# Patient Record
Sex: MALE | Race: WHITE | Employment: FULL TIME | URBAN - NONMETROPOLITAN AREA
[De-identification: names, ages, dates, MRNs, and addresses within clinical notes are randomized per-mention and may not be internally consistent; named-entity substitution may affect disease eponyms.]

---

## 2012-08-17 LAB
CHOLESTEROL, TOTAL: 184 MG/DL
CHOLESTEROL/HDL RATIO: 4.4
HDLC SERPL-MCNC: 42 MG/DL (ref 35–70)
LDL CHOLESTEROL CALCULATED: 119.2 MG/DL (ref 0–160)
TRIGL SERPL-MCNC: 114 MG/DL
VLDLC SERPL CALC-MCNC: 23 MG/DL

## 2017-07-24 DIAGNOSIS — I10 ESSENTIAL HYPERTENSION: Primary | ICD-10-CM

## 2017-07-25 RX ORDER — FOSINOPRIL SODIUM 40 MG/1
40 TABLET ORAL DAILY
Qty: 90 TABLET | Refills: 3 | Status: SHIPPED | OUTPATIENT
Start: 2017-07-25 | End: 2017-12-21 | Stop reason: SDUPTHER

## 2017-07-25 RX ORDER — AMLODIPINE BESYLATE 10 MG/1
10 TABLET ORAL DAILY
Qty: 90 TABLET | Refills: 3 | Status: SHIPPED | OUTPATIENT
Start: 2017-07-25 | End: 2017-12-21 | Stop reason: SDUPTHER

## 2017-07-31 ENCOUNTER — TELEPHONE (OUTPATIENT)
Dept: FAMILY MEDICINE CLINIC | Age: 47
End: 2017-07-31

## 2017-12-20 VITALS
HEIGHT: 68 IN | WEIGHT: 266 LBS | SYSTOLIC BLOOD PRESSURE: 128 MMHG | DIASTOLIC BLOOD PRESSURE: 80 MMHG | HEART RATE: 74 BPM | BODY MASS INDEX: 40.32 KG/M2

## 2017-12-20 DIAGNOSIS — J45.30 MILD PERSISTENT ASTHMA WITHOUT COMPLICATION: ICD-10-CM

## 2017-12-20 DIAGNOSIS — K21.9 GASTROESOPHAGEAL REFLUX DISEASE WITHOUT ESOPHAGITIS: ICD-10-CM

## 2017-12-20 DIAGNOSIS — G47.10 EXCESSIVE SOMNOLENCE DISORDER: ICD-10-CM

## 2017-12-20 DIAGNOSIS — Z14.8 HEMOCHROMATOSIS CARRIER: ICD-10-CM

## 2017-12-20 PROBLEM — I10 HYPERTENSION: Status: ACTIVE | Noted: 2017-12-20

## 2017-12-20 RX ORDER — IPRATROPIUM BROMIDE AND ALBUTEROL SULFATE 2.5; .5 MG/3ML; MG/3ML
1 SOLUTION RESPIRATORY (INHALATION) EVERY 6 HOURS PRN
COMMUNITY
End: 2017-12-21 | Stop reason: SDUPTHER

## 2017-12-20 RX ORDER — FLUTICASONE PROPIONATE 220 UG/1
1 AEROSOL, METERED RESPIRATORY (INHALATION) 2 TIMES DAILY
COMMUNITY
End: 2019-10-05

## 2017-12-21 ENCOUNTER — OFFICE VISIT (OUTPATIENT)
Dept: FAMILY MEDICINE CLINIC | Age: 47
End: 2017-12-21
Payer: COMMERCIAL

## 2017-12-21 VITALS
DIASTOLIC BLOOD PRESSURE: 70 MMHG | WEIGHT: 281 LBS | BODY MASS INDEX: 42.73 KG/M2 | HEART RATE: 84 BPM | SYSTOLIC BLOOD PRESSURE: 120 MMHG

## 2017-12-21 DIAGNOSIS — J45.30 MILD PERSISTENT ASTHMA WITHOUT COMPLICATION: ICD-10-CM

## 2017-12-21 DIAGNOSIS — I10 ESSENTIAL HYPERTENSION: ICD-10-CM

## 2017-12-21 DIAGNOSIS — K21.9 GASTROESOPHAGEAL REFLUX DISEASE WITHOUT ESOPHAGITIS: ICD-10-CM

## 2017-12-21 DIAGNOSIS — Z14.8 HEMOCHROMATOSIS CARRIER: ICD-10-CM

## 2017-12-21 DIAGNOSIS — Z23 NEED FOR HEPATITIS VACCINATION: ICD-10-CM

## 2017-12-21 DIAGNOSIS — F43.20 ADJUSTMENT DISORDER, UNSPECIFIED TYPE: ICD-10-CM

## 2017-12-21 DIAGNOSIS — Z00.00 EXAMINATION, MEDICAL, GENERAL: Primary | ICD-10-CM

## 2017-12-21 LAB
% SATURATION: 32 %
BASOPHILS # BLD: 0.14 THOU/MM3
DIFFERENTIAL: AUTOMATED DIFF
EOSINOPHIL # BLD: 0.29 THOU/MM3
FERRITIN: 295 NG/ML
HCT VFR BLD CALC: 43.5 %
HEMOGLOBIN: 13.9 G/DL
IRON: 100 MG/DL
LYMPHOCYTES # BLD: 2.36 THOU/MM3
MCH RBC QN AUTO: 27.2 PG
MCHC RBC AUTO-ENTMCNC: 31.9 G/DL
MCV RBC AUTO: 85.5 FL
MONOCYTES # BLD: 0.83 THOU/MM3
NEUTROPHILS: 3.85 THOU/MM3
PDW BLD-RTO: 12.6 %
PLATELET # BLD: 252 THOU/MM3
PMV BLD AUTO: 8.9 FL
RBC # BLD: 5.09 M/UL
TOTAL IRON BINDING CAPACITY: 307 MG/DL
WBC # BLD: 7.47 THOU/ML3

## 2017-12-21 PROCEDURE — 99396 PREV VISIT EST AGE 40-64: CPT | Performed by: FAMILY MEDICINE

## 2017-12-21 PROCEDURE — 90471 IMMUNIZATION ADMIN: CPT | Performed by: FAMILY MEDICINE

## 2017-12-21 PROCEDURE — 90688 IIV4 VACCINE SPLT 0.5 ML IM: CPT | Performed by: FAMILY MEDICINE

## 2017-12-21 RX ORDER — FOSINOPRIL SODIUM 40 MG/1
40 TABLET ORAL DAILY
Qty: 90 TABLET | Refills: 3 | Status: SHIPPED | OUTPATIENT
Start: 2017-12-21 | End: 2018-12-20 | Stop reason: SDUPTHER

## 2017-12-21 RX ORDER — IPRATROPIUM BROMIDE AND ALBUTEROL SULFATE 2.5; .5 MG/3ML; MG/3ML
1 SOLUTION RESPIRATORY (INHALATION) EVERY 6 HOURS PRN
Qty: 360 ML | Refills: 1 | Status: SHIPPED | OUTPATIENT
Start: 2017-12-21 | End: 2020-03-10 | Stop reason: SDUPTHER

## 2017-12-21 RX ORDER — AMLODIPINE BESYLATE 10 MG/1
10 TABLET ORAL DAILY
Qty: 90 TABLET | Refills: 3 | Status: SHIPPED | OUTPATIENT
Start: 2017-12-21 | End: 2018-12-20 | Stop reason: SDUPTHER

## 2017-12-25 ASSESSMENT — ENCOUNTER SYMPTOMS
SORE THROAT: 0
CHEST TIGHTNESS: 0
ABDOMINAL PAIN: 0
SHORTNESS OF BREATH: 0
WHEEZING: 0
ABDOMINAL DISTENTION: 0

## 2017-12-25 NOTE — PROGRESS NOTES
[address] [address] [address]  Dept: [phone]  Dept Fax: [phone]    Sim Boyle is a 50 y.o. male who presents today for his medical conditions/complaints as noted below. Sim Boyle is c/o of Hypertension (not checking BP much at all, when does check its ok, denies chest pain, s.o.b, palpatations) and Annual Exam      HPI:     HPI   Patient is here for a regular checkup ; only comes in once a year   Hypertension  Checks his BP rarely  well controlled; BP: 120/70   No problems with medication side effects; tolerating well  No chest pain  No edema   . Hemochromatosis  Carrier of gene; compound heterozygote for C282 and H63D mutations    He donates blood every so often to keep his Hb down  No longer goes to see hematology last notes from Dr Gen Mccracken in 2015    AZEB  Recently diagnosed with Dr Kate Aceves ; says he doesn't know how he did without it   Has CPAP, compliant with: wears faithfully  Feels rested upon awakening  Denies excessive somnolence  Last evaluated  Pulmonologist: Dr Kate Aceves  Pulmonary function tests: 5/2017    Stress  Says he feels like he can't relax  Things at work get on his nerves  Wife says he is irritable  \"doesn't want to start a pill or anything\"      GERD  Patient is doing well;    Not taking any medication  Denies heartburn  Denies dysphagia  Denies melena or hematochezia   Last EGD: none had upper GI series in 2013 ; normal   Gastroenterologist : none though he saw Dr Ruth Kaufman in the past for hemochromatosis         BP Readings from Last 3 Encounters:   12/21/17 120/70   12/13/16 128/80            (goal 120/80)    Past Medical History:   Diagnosis Date    Asthma     GERD (gastroesophageal reflux disease)     Hemochromatosis carrier     Hypertension       Past Surgical History:   Procedure Laterality Date    VASECTOMY       Family History   Problem Relation Age of Onset    Coronary Art Dis Mother    [facility] Other Mother aortic aneurysm     Social History   Substance Use Topics    Smoking status: Former Smoker    Smokeless tobacco: Former User     Types: Chew    Alcohol use No        Current Outpatient Prescriptions   Medication Sig Dispense Refill    amLODIPine (NORVASC) 10 MG tablet Take 1 tablet by mouth daily 90 tablet 3    fosinopril (MONOPRIL) 40 MG tablet Take 1 tablet by mouth daily 90 tablet 3    ipratropium-albuterol (DUONEB) 0.5-2.5 (3) MG/3ML SOLN nebulizer solution Inhale 3 mLs into the lungs every 6 hours as needed for Shortness of Breath 360 mL 1    fluticasone (FLOVENT HFA) 220 MCG/ACT inhaler Inhale 1 puff into the lungs 2 times daily       No current facility-administered medications for this visit. No Known Allergies    Health Maintenance   Topic Date Due    HIV screen  02/19/1983    DTaP/Tdap/Td vaccine (1 - Tdap) 10/05/2009    Lipid screen  08/17/2017    Flu vaccine  Completed    Pneumococcal med risk  Completed       Subjective:      Review of Systems   Constitutional: Negative for chills, diaphoresis and fever. HENT: Negative for sore throat. Respiratory: Negative for chest tightness, shortness of breath and wheezing. Cardiovascular: Negative for chest pain, palpitations and leg swelling. Gastrointestinal: Negative for abdominal distention and abdominal pain. Genitourinary: Negative for difficulty urinating, dysuria and hematuria. Hematological: Negative for adenopathy. Objective:     /70   Pulse 84   Wt 281 lb (127.5 kg)   BMI 42.73 kg/m²     Physical Exam   Constitutional: He appears well-developed and well-nourished. No distress. HENT:   Head: Normocephalic and atraumatic. Right Ear: Tympanic membrane normal.   Left Ear: Tympanic membrane normal.   Nose: Nose normal.   Mouth/Throat: No posterior oropharyngeal edema or posterior oropharyngeal erythema. Constricted oropharynx   Neck: Neck supple. Carotid bruit is not present. No thyromegaly present. Cardiovascular: Regular rhythm, S1 normal and S2 normal.    No murmur heard. Pulmonary/Chest: Breath sounds normal. He has no wheezes. He has no rhonchi. He has no rales. Abdominal: Soft. Bowel sounds are normal. There is no hepatosplenomegaly. There is no tenderness. Musculoskeletal: Normal range of motion. He exhibits no edema. Lymphadenopathy:     He has no cervical adenopathy. He has no axillary adenopathy. Skin: No rash noted. Psychiatric: He has a normal mood and affect. His speech is normal and behavior is normal.   Vitals reviewed. Assessment:     1. Examination, medical, general     2. Need for hepatitis vaccination  INFLUENZA, QUADV, 3 YRS AND OLDER, IM, MDV, 0.5ML (FLUZONE QUADV)   3. Gastroesophageal reflux disease without esophagitis     4. Hemochromatosis carrier  CBC With Auto Differential    Iron And TIBC    Ferritin   5. Essential hypertension  amLODIPine (NORVASC) 10 MG tablet    fosinopril (MONOPRIL) 40 MG tablet   6. Mild persistent asthma without complication  ipratropium-albuterol (DUONEB) 0.5-2.5 (3) MG/3ML SOLN nebulizer solution   7. Adjustment disorder, unspecified type              POC Testing Results (If Applicable):  No results found for this visit on 12/21/17. Plan:   Continue current meds  Continue CPAP and follow up with Dr Benjy Fraga  Suggestions given for dealing with stress, etc  Patient just wants to follow up once a year though he probably should follow up every 6 months minimum      Orders Given:  Orders Placed This Encounter   Procedures    INFLUENZA, QUADV, 3 YRS AND OLDER, IM, MDV, 0.5ML (FLUZONE QUADV)    CBC With Auto Differential     Standing Status:   Future     Standing Expiration Date:   12/21/2018    Iron And TIBC     Standing Status:   Future     Standing Expiration Date:   12/21/2018     Order Specific Question:   Is Patient Fasting? Answer:   no     Order Specific Question:   No of Hours?      Answer:   4    Ferritin     Standing Status: Future     Standing Expiration Date:   12/21/2018    CBC Auto Differential    Iron and TIBC    Ferritin     Prescriptions:    Orders Placed This Encounter   Medications    amLODIPine (NORVASC) 10 MG tablet     Sig: Take 1 tablet by mouth daily     Dispense:  90 tablet     Refill:  3    fosinopril (MONOPRIL) 40 MG tablet     Sig: Take 1 tablet by mouth daily     Dispense:  90 tablet     Refill:  3    ipratropium-albuterol (DUONEB) 0.5-2.5 (3) MG/3ML SOLN nebulizer solution     Sig: Inhale 3 mLs into the lungs every 6 hours as needed for Shortness of Breath     Dispense:  360 mL     Refill:  1           Electronically signed by Jj Head MD on 12/25/2017.

## 2018-12-20 ENCOUNTER — OFFICE VISIT (OUTPATIENT)
Dept: FAMILY MEDICINE CLINIC | Age: 48
End: 2018-12-20
Payer: COMMERCIAL

## 2018-12-20 VITALS
DIASTOLIC BLOOD PRESSURE: 62 MMHG | OXYGEN SATURATION: 99 % | HEART RATE: 98 BPM | WEIGHT: 282.2 LBS | SYSTOLIC BLOOD PRESSURE: 118 MMHG | HEIGHT: 68 IN | TEMPERATURE: 98.7 F | BODY MASS INDEX: 42.77 KG/M2

## 2018-12-20 DIAGNOSIS — F43.20 ADJUSTMENT DISORDER, UNSPECIFIED TYPE: ICD-10-CM

## 2018-12-20 DIAGNOSIS — I10 ESSENTIAL HYPERTENSION: ICD-10-CM

## 2018-12-20 DIAGNOSIS — G47.33 OSA (OBSTRUCTIVE SLEEP APNEA): ICD-10-CM

## 2018-12-20 DIAGNOSIS — Z13.220 SCREENING, LIPID: ICD-10-CM

## 2018-12-20 DIAGNOSIS — M25.512 CHRONIC LEFT SHOULDER PAIN: ICD-10-CM

## 2018-12-20 DIAGNOSIS — Z12.11 ENCOUNTER FOR SCREENING COLONOSCOPY: ICD-10-CM

## 2018-12-20 DIAGNOSIS — Z14.8 HEMOCHROMATOSIS CARRIER: ICD-10-CM

## 2018-12-20 DIAGNOSIS — Z23 NEED FOR PROPHYLACTIC VACCINATION AND INOCULATION AGAINST INFLUENZA: Primary | ICD-10-CM

## 2018-12-20 DIAGNOSIS — K21.9 GASTROESOPHAGEAL REFLUX DISEASE WITHOUT ESOPHAGITIS: ICD-10-CM

## 2018-12-20 DIAGNOSIS — G89.29 CHRONIC LEFT SHOULDER PAIN: ICD-10-CM

## 2018-12-20 PROCEDURE — 99396 PREV VISIT EST AGE 40-64: CPT | Performed by: FAMILY MEDICINE

## 2018-12-20 PROCEDURE — 99213 OFFICE O/P EST LOW 20 MIN: CPT | Performed by: FAMILY MEDICINE

## 2018-12-20 PROCEDURE — 90471 IMMUNIZATION ADMIN: CPT | Performed by: FAMILY MEDICINE

## 2018-12-20 PROCEDURE — 90686 IIV4 VACC NO PRSV 0.5 ML IM: CPT | Performed by: FAMILY MEDICINE

## 2018-12-20 RX ORDER — FOSINOPRIL SODIUM 40 MG/1
40 TABLET ORAL DAILY
Qty: 90 TABLET | Refills: 3 | Status: SHIPPED | OUTPATIENT
Start: 2018-12-20 | End: 2020-01-20

## 2018-12-20 RX ORDER — MELOXICAM 15 MG/1
15 TABLET ORAL DAILY
Qty: 30 TABLET | Refills: 3 | Status: SHIPPED | OUTPATIENT
Start: 2018-12-20 | End: 2019-10-05 | Stop reason: ALTCHOICE

## 2018-12-20 RX ORDER — AMLODIPINE BESYLATE 10 MG/1
10 TABLET ORAL DAILY
Qty: 90 TABLET | Refills: 3 | Status: SHIPPED | OUTPATIENT
Start: 2018-12-20 | End: 2020-01-20

## 2018-12-20 ASSESSMENT — PATIENT HEALTH QUESTIONNAIRE - PHQ9
SUM OF ALL RESPONSES TO PHQ QUESTIONS 1-9: 0
SUM OF ALL RESPONSES TO PHQ QUESTIONS 1-9: 0
1. LITTLE INTEREST OR PLEASURE IN DOING THINGS: 0
2. FEELING DOWN, DEPRESSED OR HOPELESS: 0
SUM OF ALL RESPONSES TO PHQ9 QUESTIONS 1 & 2: 0

## 2018-12-22 LAB
% SATURATION: 56 %
A/G RATIO: 1.3 RATIO
AGE FOR GFR: 50
ALBUMIN: 4 G/DL
ALK PHOSPHATASE: 106 UNITS/L
ALT SERPL-CCNC: 33 UNITS/L
ANION GAP SERPL CALCULATED.3IONS-SCNC: 12 MMOL/L
AST SERPL-CCNC: 30 UNITS/L
BASOPHILS # BLD: 0.11 THOU/MM3
BILIRUB SERPL-MCNC: 0.8 MG/DL
BILIRUBIN DIRECT: 0 MG/DL
BUN BLDV-MCNC: 15 MG/DL
CALCIUM SERPL-MCNC: 9.2 MG/DL
CHLORIDE BLD-SCNC: 107 MMOL/L
CHOLESTEROL/HDL RATIO: 4.6 RATIO
CHOLESTEROL: 180 MG/DL
CO2: 25 MMOL/L
CREAT SERPL-MCNC: 0.7 MG/DL
DIFFERENTIAL: AUTOMATED DIFF
EGFR BF: 107 ML/MIN/1.73 M2
EGFR BM: 145 ML/MIN/1.73 M2
EGFR WF: 89 ML/MIN/1.73 M2
EGFR WM: 119 ML/MIN/1.73 M2
EOSINOPHIL # BLD: 0.23 THOU/MM3
FERRITIN: 220 NG/ML
GLOBULIN: 3.1 G/DL
GLUCOSE: 94 MG/DL
HCT VFR BLD CALC: 42.5 %
HDL, DIRECT: 39 MG/DL
HEMOGLOBIN: 13.5 G/DL
IRON: 165 MG/DL
LDL CHOLESTEROL CALCULATED: 116.4 MG/DL
LYMPHOCYTES # BLD: 1.73 THOU/MM3
MCH RBC QN AUTO: 27 PG
MCHC RBC AUTO-ENTMCNC: 31.6 G/DL
MCV RBC AUTO: 85.2 FL
MONOCYTES # BLD: 0.57 THOU/MM3
NEUTROPHILS: 2.82 THOU/MM3
PDW BLD-RTO: 12.4 %
PLATELET # BLD: 265 THOU/MM3
PMV BLD AUTO: 8 FL
POTASSIUM SERPL-SCNC: 4 MMOL/L
RBC # BLD: 4.99 M/UL
SODIUM BLD-SCNC: 140 MMOL/L
TOTAL IRON BINDING CAPACITY: 297 MG/DL
TOTAL PROTEIN: 7.1 G/DL
TRIGL SERPL-MCNC: 123 MG/DL
VLDLC SERPL CALC-MCNC: 25 MG/DL
WBC # BLD: 5.46 THOU/ML3

## 2018-12-27 ASSESSMENT — ENCOUNTER SYMPTOMS
BACK PAIN: 1
ABDOMINAL DISTENTION: 0
WHEEZING: 0
BLOOD IN STOOL: 0
CHEST TIGHTNESS: 0
SHORTNESS OF BREATH: 0
ABDOMINAL PAIN: 0

## 2019-01-07 ENCOUNTER — TELEPHONE (OUTPATIENT)
Dept: FAMILY MEDICINE CLINIC | Age: 49
End: 2019-01-07

## 2019-01-10 ENCOUNTER — OFFICE VISIT (OUTPATIENT)
Dept: SURGERY | Age: 49
End: 2019-01-10

## 2019-01-10 DIAGNOSIS — Z12.11 ENCOUNTER FOR SCREENING COLONOSCOPY: Primary | ICD-10-CM

## 2019-01-10 RX ORDER — BISACODYL 5 MG
5 TABLET, DELAYED RELEASE (ENTERIC COATED) ORAL ONCE
Qty: 2 TABLET | Refills: 0 | Status: CANCELLED | OUTPATIENT
Start: 2019-01-10 | End: 2019-01-10

## 2019-10-05 ENCOUNTER — OFFICE VISIT (OUTPATIENT)
Dept: FAMILY MEDICINE CLINIC | Age: 49
End: 2019-10-05
Payer: COMMERCIAL

## 2019-10-05 VITALS
DIASTOLIC BLOOD PRESSURE: 72 MMHG | BODY MASS INDEX: 42.88 KG/M2 | HEART RATE: 79 BPM | WEIGHT: 282 LBS | OXYGEN SATURATION: 99 % | SYSTOLIC BLOOD PRESSURE: 134 MMHG

## 2019-10-05 DIAGNOSIS — M72.2 PLANTAR FASCIITIS, RIGHT: Primary | ICD-10-CM

## 2019-10-05 PROCEDURE — 99213 OFFICE O/P EST LOW 20 MIN: CPT | Performed by: NURSE PRACTITIONER

## 2019-10-05 RX ORDER — PREDNISONE 20 MG/1
20 TABLET ORAL 2 TIMES DAILY
Qty: 10 TABLET | Refills: 0 | Status: SHIPPED | OUTPATIENT
Start: 2019-10-05 | End: 2019-10-10

## 2019-10-05 ASSESSMENT — PATIENT HEALTH QUESTIONNAIRE - PHQ9
SUM OF ALL RESPONSES TO PHQ QUESTIONS 1-9: 0
SUM OF ALL RESPONSES TO PHQ QUESTIONS 1-9: 0
1. LITTLE INTEREST OR PLEASURE IN DOING THINGS: 0
SUM OF ALL RESPONSES TO PHQ9 QUESTIONS 1 & 2: 0
2. FEELING DOWN, DEPRESSED OR HOPELESS: 0

## 2020-01-28 RX ORDER — AMLODIPINE BESYLATE 10 MG/1
10 TABLET ORAL DAILY
Qty: 90 TABLET | Refills: 3 | Status: SHIPPED | OUTPATIENT
Start: 2020-01-28 | End: 2020-03-10 | Stop reason: CLARIF

## 2020-01-28 RX ORDER — FOSINOPRIL SODIUM 40 MG/1
40 TABLET ORAL DAILY
Qty: 90 TABLET | Refills: 3 | Status: SHIPPED | OUTPATIENT
Start: 2020-01-28 | End: 2020-03-10 | Stop reason: CLARIF

## 2020-01-28 NOTE — TELEPHONE ENCOUNTER
Joni Carmona is requesting a refill on the following medication(s):  Requested Prescriptions     Pending Prescriptions Disp Refills    amLODIPine (NORVASC) 10 MG tablet 90 tablet 3     Sig: Take 1 tablet by mouth daily    fosinopril (MONOPRIL) 40 MG tablet 90 tablet 3     Sig: Take 1 tablet by mouth daily       Last Visit Date (If Applicable):  37/78/6907    Next Visit Date:    3/10/2020

## 2020-03-10 ENCOUNTER — HOSPITAL ENCOUNTER (OUTPATIENT)
Age: 50
Setting detail: SPECIMEN
Discharge: HOME | End: 2020-03-10
Payer: COMMERCIAL

## 2020-03-10 ENCOUNTER — OFFICE VISIT (OUTPATIENT)
Dept: FAMILY MEDICINE CLINIC | Age: 50
End: 2020-03-10
Payer: COMMERCIAL

## 2020-03-10 VITALS
BODY MASS INDEX: 41.66 KG/M2 | WEIGHT: 274 LBS | HEART RATE: 70 BPM | SYSTOLIC BLOOD PRESSURE: 132 MMHG | OXYGEN SATURATION: 98 % | DIASTOLIC BLOOD PRESSURE: 86 MMHG

## 2020-03-10 PROBLEM — E83.110 HEREDITARY HEMOCHROMATOSIS (HCC): Status: ACTIVE | Noted: 2020-03-10

## 2020-03-10 LAB
ABSOLUTE EOS #: 0.17 K/UL (ref 0–0.44)
ABSOLUTE IMMATURE GRANULOCYTE: <0.03 K/UL (ref 0–0.3)
ABSOLUTE LYMPH #: 1.87 K/UL (ref 1.1–3.7)
ABSOLUTE MONO #: 0.74 K/UL (ref 0.1–1.2)
ALBUMIN SERPL-MCNC: 4.3 G/DL (ref 3.5–5.2)
ALBUMIN/GLOBULIN RATIO: 1.7 (ref 1–2.5)
ALP BLD-CCNC: 88 U/L (ref 40–129)
ALT SERPL-CCNC: 24 U/L (ref 5–41)
ANION GAP SERPL CALCULATED.3IONS-SCNC: 14 MMOL/L (ref 9–17)
AST SERPL-CCNC: 21 U/L
BASOPHILS # BLD: 1 % (ref 0–2)
BASOPHILS ABSOLUTE: 0.07 K/UL (ref 0–0.2)
BILIRUB SERPL-MCNC: 0.41 MG/DL (ref 0.3–1.2)
BUN BLDV-MCNC: 15 MG/DL (ref 6–20)
BUN/CREAT BLD: 14 (ref 9–20)
CALCIUM SERPL-MCNC: 9.3 MG/DL (ref 8.6–10.4)
CHLORIDE BLD-SCNC: 101 MMOL/L (ref 98–107)
CHOLESTEROL/HDL RATIO: 4.4
CHOLESTEROL: 195 MG/DL
CO2: 26 MMOL/L (ref 20–31)
CREAT SERPL-MCNC: 1.08 MG/DL (ref 0.7–1.2)
DIFFERENTIAL TYPE: ABNORMAL
EOSINOPHILS RELATIVE PERCENT: 2 % (ref 1–4)
GFR AFRICAN AMERICAN: >60 ML/MIN
GFR NON-AFRICAN AMERICAN: >60 ML/MIN
GFR SERPL CREATININE-BSD FRML MDRD: NORMAL ML/MIN/{1.73_M2}
GFR SERPL CREATININE-BSD FRML MDRD: NORMAL ML/MIN/{1.73_M2}
GLUCOSE FASTING: 83 MG/DL (ref 70–99)
HCT VFR BLD CALC: 40.1 % (ref 40.7–50.3)
HDLC SERPL-MCNC: 44 MG/DL
HEMOGLOBIN: 13 G/DL (ref 13–17)
IMMATURE GRANULOCYTES: 0 %
LDL CHOLESTEROL: 121 MG/DL (ref 0–130)
LYMPHOCYTES # BLD: 24 % (ref 24–43)
MCH RBC QN AUTO: 27.7 PG (ref 25.2–33.5)
MCHC RBC AUTO-ENTMCNC: 32.4 G/DL (ref 25.2–33.5)
MCV RBC AUTO: 85.3 FL (ref 82.6–102.9)
MONOCYTES # BLD: 9 % (ref 3–12)
NRBC AUTOMATED: 0 PER 100 WBC
PDW BLD-RTO: 14 % (ref 11.8–14.4)
PLATELET # BLD: 274 K/UL (ref 138–453)
PLATELET ESTIMATE: ABNORMAL
PMV BLD AUTO: 11.2 FL (ref 8.1–13.5)
POTASSIUM SERPL-SCNC: 3.9 MMOL/L (ref 3.7–5.3)
RBC # BLD: 4.7 M/UL (ref 4.21–5.77)
RBC # BLD: ABNORMAL 10*6/UL
SEG NEUTROPHILS: 64 % (ref 36–65)
SEGMENTED NEUTROPHILS ABSOLUTE COUNT: 5.03 K/UL (ref 1.5–8.1)
SODIUM BLD-SCNC: 141 MMOL/L (ref 135–144)
TOTAL PROTEIN: 6.8 G/DL (ref 6.4–8.3)
TRIGL SERPL-MCNC: 152 MG/DL
VLDLC SERPL CALC-MCNC: ABNORMAL MG/DL (ref 1–30)
WBC # BLD: 7.9 K/UL (ref 3.5–11.3)
WBC # BLD: ABNORMAL 10*3/UL

## 2020-03-10 PROCEDURE — 85025 COMPLETE CBC W/AUTO DIFF WBC: CPT

## 2020-03-10 PROCEDURE — 83550 IRON BINDING TEST: CPT

## 2020-03-10 PROCEDURE — 80061 LIPID PANEL: CPT

## 2020-03-10 PROCEDURE — 99214 OFFICE O/P EST MOD 30 MIN: CPT | Performed by: FAMILY MEDICINE

## 2020-03-10 PROCEDURE — 36415 COLL VENOUS BLD VENIPUNCTURE: CPT

## 2020-03-10 PROCEDURE — 80053 COMPREHEN METABOLIC PANEL: CPT

## 2020-03-10 PROCEDURE — 82728 ASSAY OF FERRITIN: CPT

## 2020-03-10 PROCEDURE — 83540 ASSAY OF IRON: CPT

## 2020-03-10 RX ORDER — IPRATROPIUM BROMIDE AND ALBUTEROL SULFATE 2.5; .5 MG/3ML; MG/3ML
1 SOLUTION RESPIRATORY (INHALATION) EVERY 6 HOURS PRN
Qty: 360 ML | Refills: 3 | Status: SHIPPED | OUTPATIENT
Start: 2020-03-10 | End: 2022-03-07

## 2020-03-10 RX ORDER — AMLODIPINE BESYLATE 10 MG/1
TABLET ORAL
Qty: 90 TABLET | Refills: 3 | Status: SHIPPED | OUTPATIENT
Start: 2020-03-10 | End: 2021-01-27 | Stop reason: SDUPTHER

## 2020-03-10 RX ORDER — FOSINOPRIL SODIUM 40 MG/1
TABLET ORAL
Qty: 90 TABLET | Refills: 3 | Status: SHIPPED | OUTPATIENT
Start: 2020-03-10 | End: 2021-01-27 | Stop reason: SDUPTHER

## 2020-03-10 ASSESSMENT — ENCOUNTER SYMPTOMS
BACK PAIN: 0
WHEEZING: 0
ABDOMINAL DISTENTION: 0
SHORTNESS OF BREATH: 0
COUGH: 0
BLOOD IN STOOL: 0
ABDOMINAL PAIN: 0

## 2020-03-10 ASSESSMENT — PATIENT HEALTH QUESTIONNAIRE - PHQ9
SUM OF ALL RESPONSES TO PHQ9 QUESTIONS 1 & 2: 0
SUM OF ALL RESPONSES TO PHQ QUESTIONS 1-9: 0
SUM OF ALL RESPONSES TO PHQ QUESTIONS 1-9: 0
1. LITTLE INTEREST OR PLEASURE IN DOING THINGS: 0
2. FEELING DOWN, DEPRESSED OR HOPELESS: 0

## 2020-03-10 NOTE — PROGRESS NOTES
[address] [address] [address]  Dept: [phone]  DeptFax: [phone]    Rukhsana Santos is a53 y.o. male who presents today for his medical conditions/complaints as noted below. Rukhsana Santos is c/o of Gastroesophageal Reflux (no issues or complaints); Hypertension (no issues); and Medication Refill      HPI:     HPI     Patient returns to my request 1 year follow-up. His problems include    Hypertension  The patient checks his blood pressures rarely. His blood pressure for us is 132/86. Harish Burris He remains on Monopril 40 mg and Norvasc 10 mg. He has no chest pain or chest pressure. No cough. No edema. Results for Rossi Ee (MRN [id number]) as of 3/10/2020 07:06   Ref. Range 12/22/2018 08:10   Chol/HDL Ratio Latest Ref Range: 0.0 - 4.5 ratio 4.6 (H)   Cholesterol Latest Ref Range: 50 - 200 mg/dL 180   LDL Calculated Latest Ref Range: 0.0 - 160 mg/dL 116.4   Triglycerides Latest Ref Range: 10 - 250 mg/dL 123   VLDL Latest Ref Range: 0 - 40 mg/dL 25          Hemochromatosis  The patient is a compound heterozygote for hereditary hemochromatosis carrying a copy of both the C282Y gene and the H63D gene. He no longer sees Dr. Hazel Briceño. He donates blood about every 3 months. Or as often as he can depending on his work schedule. In the past he has had a MRI of his liver suggesting iron deposition and was referred to Gastroenterology, Dr. Reed Wills. I am unable to locate those records. However, he states that Gastroenterology did not want to do a liver biopsy. Harish Burris He has no itching. He has no headache. He has no flushing. Results for Rossi Ee (MRN [id number]) as of 3/10/2020 07:06   Ref.  Range 12/22/2018 08:10   WBC Latest Ref Range: 4.80 - 10 thou/ml3 5.46   RBC Latest Ref Range: 4.70 - 6.1 M/uL 4.99   Hemoglobin Quant Latest Ref Range: 13.8 - 17 g/dL 13.5 (L)   Hematocrit Latest Ref Range: 42.0 - 52 % 42.5   MCV Latest Ref Range: 80.0 - 94 fl 85.2   MCH Latest Ref Range: 28.5 - 32 pg 27.0 (L)   MCHC Latest Ref Range: 32.0 - 37 g/dL 31.6 (L)   MPV Latest Ref Range: 7.4 - 11.0 fl 8.0   RDW Latest Ref Range: 10.0 - 15 % 12.4   Platelet Count Latest Ref Range: 130 - 400 thou/mm3 265     Results for Christopher Allen (MRN [id number]) as of 3/10/2020 07:06   Ref. Range 12/22/2018 08:10   Ferritin Latest Ref Range: 16.00 - 32 ng/mL 220   Iron Latest Ref Range: 49 - 181 mg/dL 165   TIBC Latest Ref Range: 261 - 497 mg/dL 297        Obstructive sleep apnea  He wears his CPAP faithfully. He no longer follows with Pulmonology. He used to see Dr. Ernesto Marks. He has no excessive somnolence or fatigue. He had pulmonary function tests in 2017.      GERD  The patient is asymptomatic. No heartburn. No dysphagia. No melena or hematochezia. An upper GI series in 2013 was normal.     Adjustment disorder  In the past he has had some troubles with struggles with irritability, being unable to relax, stressed out at work. 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 Present time he deals with the situation. Did not see me quite as bad as it was. Probably stress related    Patient also has problems with his left ankle. He twisted his left ankle couple times. He has been wearing a brace. But the left ankle feels unstable. Feels like less he is careful he will suffer from eversion sprain again. Does not hurt to bear weight. Not swollen.       BP Readings from Last 3 Encounters:   03/10/20 132/86   10/05/19 134/72   12/20/18 118/62            (goal 120/80)    Past Medical History:   Diagnosis Date    Asthma     GERD (gastroesophageal reflux disease)     Hemochromatosis carrier     Hypertension       Past Surgical History:   Procedure Laterality Date    COLONOSCOPY  01/25/2019    Dr. Kenton Britton at [address] [place]       Family History   Problem Relation Age of Onset    Coronary Art Dis Mother     Other Mother         aortic aneurysm    Breast Cancer Mother     Cancer Father      Social History     Tobacco Use    Smoking status: Former Smoker    Smokeless tobacco: Former User     Types: Chew   Substance Use Topics    Alcohol use: No        Current Outpatient Medications   Medication Sig Dispense Refill    ipratropium-albuterol (DUONEB) 0.5-2.5 (3) MG/3ML SOLN nebulizer solution Inhale 3 mLs into the lungs every 6 hours as needed for Shortness of Breath 360 mL 3    fosinopril (MONOPRIL) 40 MG tablet TAKE 1 TABLET DAILY 90 tablet 3    amLODIPine (NORVASC) 10 MG tablet TAKE 1 TABLET DAILY 90 tablet 3     No current facility-administered medications for this visit. No Known Allergies    Health Maintenance   Topic Date Due    HIV screen  02/19/1983    DTaP/Tdap/Td vaccine (1 - Tdap) 02/19/1987    Shingles Vaccine (1 of 2) 02/19/2018    Potassium monitoring  12/22/2019    Creatinine monitoring  12/22/2019    Lipid screen  12/22/2023    Colon cancer screen colonoscopy  01/25/2029    Flu vaccine  Completed    Pneumococcal 0-64 years Vaccine  Completed    Hepatitis A vaccine  Aged Out    Hepatitis B vaccine  Aged Out    Hib vaccine  Aged Out    Meningococcal (ACWY) vaccine  Aged Out       Lab Results   Component Value Date    K 4.0 12/22/2018    CREATININE 0.7 12/22/2018    AST 30 12/22/2018    ALT 33 12/22/2018    HCT 42.5 12/22/2018    GLUCOSE 94 12/22/2018    CALCIUM 9.2 12/22/2018    FERRITIN 220 12/22/2018    TIBC 297 12/22/2018    IRON 165 12/22/2018      Lab Results   Component Value Date    CHOL 180 12/22/2018    CHOL 184 08/17/2012    TRIG 123 12/22/2018    HDL 42 08/17/2012       Subjective:      Review of Systems   Constitutional: Negative for chills, diaphoresis, fatigue, fever and unexpected weight change. HENT: Negative. Respiratory: Negative for cough, shortness of breath and wheezing. Cardiovascular: Negative for chest pain, palpitations and leg swelling.    Gastrointestinal: Negative for abdominal distention, abdominal pain and blood in stool. Endocrine: Negative. Genitourinary: Negative for difficulty urinating, dysuria and hematuria. Musculoskeletal: Negative for arthralgias and back pain. Hematological: Negative for adenopathy. Psychiatric/Behavioral: The patient is not nervous/anxious. Objective:     /86   Pulse 70   Wt 274 lb (124.3 kg)   SpO2 98%   BMI 41.66 kg/m²     Physical Exam  Vitals signs reviewed. Constitutional:       General: He is not in acute distress. Appearance: He is well-developed. HENT:      Head: Normocephalic and atraumatic. Right Ear: Tympanic membrane normal.      Left Ear: Tympanic membrane normal.      Nose: Nose normal.      Mouth/Throat:      Pharynx: No posterior oropharyngeal erythema. Neck:      Musculoskeletal: Neck supple. Thyroid: No thyromegaly. Vascular: No carotid bruit. Cardiovascular:      Rate and Rhythm: Regular rhythm. Heart sounds: S1 normal and S2 normal. No murmur. Pulmonary:      Breath sounds: Normal breath sounds. No wheezing, rhonchi or rales. Abdominal:      General: Bowel sounds are normal.      Palpations: Abdomen is soft. Tenderness: There is no abdominal tenderness. Musculoskeletal:      Left ankle: He exhibits normal range of motion and no swelling. No lateral malleolus, no AITFL, no CF ligament and no posterior TFL tenderness found. Comments: Left ankle is not unstable to resisted stress. Cannot really palpate any tendon defect. Lymphadenopathy:      Cervical: No cervical adenopathy. Skin:     Findings: No rash. Psychiatric:         Speech: Speech normal.         Behavior: Behavior normal.         Assessment:      Diagnosis Orders   1. Hereditary hemochromatosis (HCC)  Iron And TIBC    Ferritin   2. Mild persistent asthma without complication  ipratropium-albuterol (DUONEB) 0.5-2.5 (3) MG/3ML SOLN nebulizer solution   3.  Essential hypertension  CBC Auto Ki Sanchez MD on3/10/2020. **This report has been created using voice recognition software. It may contain minor errors which are inherent in voice recognition technology. **

## 2020-03-10 NOTE — PATIENT INSTRUCTIONS
Patient Education        Chronic Ankle Laxity: Exercises  Introduction  Here are some examples of exercises for you to try. The exercises may be suggested for a condition or for rehabilitation. Start each exercise slowly. Ease off the exercises if you start to have pain. You will be told when to start these exercises and which ones will work best for you. How to do the exercises  Resisted ankle inversion   1. Sit on the floor with your good leg crossed over your other leg. 2. Hold both ends of an exercise band and loop the band around the inside of your affected foot. Then press your other foot against the band. 3. Keeping your legs crossed, slowly push your affected foot against the band so that foot moves away from your other foot. Then slowly relax. 4. Repeat 8 to 12 times. Resisted ankle eversion   1. Sit on the floor with your legs straight. 2. Hold both ends of an exercise band and loop the band around the outside of your affected foot. Then press your other foot against the band. 3. Keeping your leg straight, slowly push your affected foot outward against the band and away from your other foot without letting your leg rotate. Then slowly relax. 4. Repeat 8 to 12 times. Resisted ankle dorsiflexion   1. Tie the ends of an exercise band together to form a loop. Attach one end of the loop to a secure object or shut a door on it to hold it in place. (Or you can have someone hold one end of the loop to provide resistance.)  2. While sitting on the floor or in a chair, loop the other end of the band over the top of your affected foot. 3. Keeping your knee and leg straight, slowly flex your foot to pull back on the exercise band, and then slowly relax. 4. Repeat 8 to 12 times. Single-leg balance   1. Stand on a flat surface with your arms stretched out to your sides like you are making the letter \"T. \" Then lift your good leg off the floor, bending it at the knee.  If you are not steady on your feet, use one hand to hold on to a chair, counter, or wall. 2. Standing on the leg with your affected ankle, keep that knee straight. Try to balance on that leg for up to 30 seconds. Then rest for up to 10 seconds. 3. Repeat 6 to 8 times. 4. When you can balance on your affected leg for 30 seconds with your eyes open, try to balance on it with your eyes closed. 5. When you can do this exercise with your eyes closed for 30 seconds and with ease and no pain, try standing on a pillow or piece of foam, and repeat steps 1 through 4. Follow-up care is a key part of your treatment and safety. Be sure to make and go to all appointments, and call your doctor if you are having problems. It's also a good idea to know your test results and keep a list of the medicines you take. Where can you learn more? Go to https://PublicEarthpepicewMemamp.HeyKiki. org and sign in to your [email or web address] account. Enter S478 in the [facility] box to learn more about \"Chronic Ankle Laxity: Exercises. \"     If you do not have an account, please click on the \"Sign Up Now\" link. Current as of: June 26, 2019  Content Version: 12.3  © 7902-7628 Healthwise, Incorporated. Care instructions adapted under license by [facility] ([facility]). If you have questions about a medical condition or this instruction, always ask your healthcare professional. [place] [address] any warranty or liability for your use of this information. Patient Education        Ankle Sprain: Rehab Exercises  Introduction  Here are some examples of exercises for you to try. The exercises may be suggested for a condition or for rehabilitation. Start each exercise slowly. Ease off the exercises if you start to have pain. You will be told when to start these exercises and which ones will work best for you. How to do the exercises  \"Alphabet\" exercise   5. Trace the alphabet with your toe. This helps your ankle move in all directions.     Side-to-side knee swing exercise   5. Sit in a chair with your foot flat on the floor. 6. Slowly move your knee from side to side. Keep your foot pressed flat. 7. Continue this exercise for 2 to 3 minutes. Towel curl   5. While sitting, place your foot on a towel on the floor. Scrunch the towel toward you with your toes. 6. Then use your toes to push the towel away from you. 7. To make this exercise more challenging you can put something on the other end of the towel. A can of soup is about the right weight for this. Towel stretch   6. Sit with your legs extended and knees straight. 7. Place a towel around your foot just under the toes. 8. Hold each end of the towel in each hand, with your hands above your knees. 9. Pull back with the towel so that your foot stretches toward you. 10. Hold the position for at least 15 to 30 seconds. 11. Repeat 2 to 4 times a session. Do up to 5 sessions a day. Ankle eversion exercise   1. Start by sitting with your foot flat on the floor. Push your foot outward against a wall or a piece of furniture that doesn't move. Hold for about 6 seconds, and relax. Repeat 8 to 12 times. 2. After you feel comfortable with this, try using rubber tubing looped around the outside of your feet for resistance. Push your foot out to the side against the tubing, and then count to 10 as you slowly bring your foot back to the middle. Repeat 8 to 12 times. Isometric opposition exercises   1. While sitting, put your feet together flat on the floor. 2. Press your injured foot inward against your other foot. Hold for about 6 seconds, and relax. Repeat 8 to 12 times. 3. Then place the heel of your other foot on top of the injured one. Push down with the top heel while trying to push up with your injured foot. Hold for about 6 seconds, and relax. Repeat 8 to 12 times. Resisted ankle inversion   1. Sit on the floor with your good leg crossed over your other leg.   2. Hold both ends of an exercise band and through 4. Follow-up care is a key part of your treatment and safety. Be sure to make and go to all appointments, and call your doctor if you are having problems. It's also a good idea to know your test results and keep a list of the medicines you take. Where can you learn more? Go to https://chpepiceweb.Contently. org and sign in to your 7write account. Enter Monet Shea in the [employer]Import box to learn more about \"Ankle Sprain: Rehab Exercises. \"     If you do not have an account, please click on the \"Sign Up Now\" link. Current as of: June 26, 2019  Content Version: 12.3  © 5492-1646 Healthwise, Incorporated. Care instructions adapted under license by [facility] ([facility]). If you have questions about a medical condition or this instruction, always ask your healthcare professional. [address] any warranty or liability for your use of this information.

## 2020-03-11 LAB
FERRITIN: 248 UG/L (ref 30–400)
IRON SATURATION: 32 % (ref 20–55)
IRON: 90 UG/DL (ref 59–158)
TOTAL IRON BINDING CAPACITY: 281 UG/DL (ref 250–450)
UNSATURATED IRON BINDING CAPACITY: 191 UG/DL (ref 112–347)

## 2021-01-27 ENCOUNTER — OFFICE VISIT (OUTPATIENT)
Dept: FAMILY MEDICINE CLINIC | Age: 51
End: 2021-01-27
Payer: COMMERCIAL

## 2021-01-27 ENCOUNTER — HOSPITAL ENCOUNTER (OUTPATIENT)
Age: 51
Setting detail: SPECIMEN
Discharge: HOME | End: 2021-01-27
Payer: COMMERCIAL

## 2021-01-27 VITALS
HEART RATE: 79 BPM | DIASTOLIC BLOOD PRESSURE: 78 MMHG | SYSTOLIC BLOOD PRESSURE: 118 MMHG | BODY MASS INDEX: 41.52 KG/M2 | WEIGHT: 274 LBS | HEIGHT: 68 IN | OXYGEN SATURATION: 98 %

## 2021-01-27 DIAGNOSIS — E78.1 HYPERTRIGLYCERIDEMIA: ICD-10-CM

## 2021-01-27 DIAGNOSIS — Z11.59 ENCOUNTER FOR HEPATITIS C SCREENING TEST FOR LOW RISK PATIENT: ICD-10-CM

## 2021-01-27 DIAGNOSIS — I10 ESSENTIAL HYPERTENSION: ICD-10-CM

## 2021-01-27 DIAGNOSIS — Z23 NEED FOR DIPHTHERIA-TETANUS-PERTUSSIS (TDAP) VACCINE: ICD-10-CM

## 2021-01-27 DIAGNOSIS — I10 ESSENTIAL HYPERTENSION: Primary | ICD-10-CM

## 2021-01-27 DIAGNOSIS — Z13.220 SCREENING, LIPID: ICD-10-CM

## 2021-01-27 DIAGNOSIS — Z14.8 HEMOCHROMATOSIS CARRIER: ICD-10-CM

## 2021-01-27 DIAGNOSIS — J45.20 MILD INTERMITTENT ASTHMA WITHOUT COMPLICATION: ICD-10-CM

## 2021-01-27 LAB
ABSOLUTE EOS #: 0.2 K/UL (ref 0–0.44)
ABSOLUTE IMMATURE GRANULOCYTE: <0.03 K/UL (ref 0–0.3)
ABSOLUTE LYMPH #: 1.73 K/UL (ref 1.1–3.7)
ABSOLUTE MONO #: 0.57 K/UL (ref 0.1–1.2)
ANION GAP SERPL CALCULATED.3IONS-SCNC: 9 MMOL/L (ref 9–17)
BASOPHILS # BLD: 2 % (ref 0–2)
BASOPHILS ABSOLUTE: 0.08 K/UL (ref 0–0.2)
CHLORIDE BLD-SCNC: 104 MMOL/L (ref 98–107)
CHOLESTEROL/HDL RATIO: 4.1
CHOLESTEROL: 197 MG/DL
CO2: 26 MMOL/L (ref 20–31)
CREAT SERPL-MCNC: 0.65 MG/DL (ref 0.7–1.2)
DIFFERENTIAL TYPE: NORMAL
EOSINOPHILS RELATIVE PERCENT: 4 % (ref 1–4)
FERRITIN: 367 UG/L (ref 30–400)
GFR AFRICAN AMERICAN: >60 ML/MIN
GFR NON-AFRICAN AMERICAN: >60 ML/MIN
GFR SERPL CREATININE-BSD FRML MDRD: ABNORMAL ML/MIN/{1.73_M2}
GFR SERPL CREATININE-BSD FRML MDRD: ABNORMAL ML/MIN/{1.73_M2}
HCT VFR BLD CALC: 42.2 % (ref 40.7–50.3)
HDLC SERPL-MCNC: 48 MG/DL
HEMOGLOBIN: 13.7 G/DL (ref 13–17)
HEPATITIS C ANTIBODY: NONREACTIVE
IMMATURE GRANULOCYTES: 0 %
LDL CHOLESTEROL: 120 MG/DL (ref 0–130)
LYMPHOCYTES # BLD: 35 % (ref 24–43)
MCH RBC QN AUTO: 28.1 PG (ref 25.2–33.5)
MCHC RBC AUTO-ENTMCNC: 32.5 G/DL (ref 25.2–33.5)
MCV RBC AUTO: 86.7 FL (ref 82.6–102.9)
MONOCYTES # BLD: 12 % (ref 3–12)
NRBC AUTOMATED: 0 PER 100 WBC
PDW BLD-RTO: 13.6 % (ref 11.8–14.4)
PLATELET # BLD: 250 K/UL (ref 138–453)
PLATELET ESTIMATE: NORMAL
PMV BLD AUTO: 11.5 FL (ref 8.1–13.5)
POTASSIUM SERPL-SCNC: 4.1 MMOL/L (ref 3.7–5.3)
RBC # BLD: 4.87 M/UL (ref 4.21–5.77)
RBC # BLD: NORMAL 10*6/UL
SEG NEUTROPHILS: 47 % (ref 36–65)
SEGMENTED NEUTROPHILS ABSOLUTE COUNT: 2.33 K/UL (ref 1.5–8.1)
SODIUM BLD-SCNC: 139 MMOL/L (ref 135–144)
TRIGL SERPL-MCNC: 147 MG/DL
VLDLC SERPL CALC-MCNC: NORMAL MG/DL (ref 1–30)
WBC # BLD: 4.9 K/UL (ref 3.5–11.3)
WBC # BLD: NORMAL 10*3/UL

## 2021-01-27 PROCEDURE — 82728 ASSAY OF FERRITIN: CPT

## 2021-01-27 PROCEDURE — G8427 DOCREV CUR MEDS BY ELIG CLIN: HCPCS | Performed by: FAMILY MEDICINE

## 2021-01-27 PROCEDURE — 36415 COLL VENOUS BLD VENIPUNCTURE: CPT | Performed by: FAMILY MEDICINE

## 2021-01-27 PROCEDURE — 80051 ELECTROLYTE PANEL: CPT | Performed by: FAMILY MEDICINE

## 2021-01-27 PROCEDURE — 90715 TDAP VACCINE 7 YRS/> IM: CPT | Performed by: FAMILY MEDICINE

## 2021-01-27 PROCEDURE — 80051 ELECTROLYTE PANEL: CPT

## 2021-01-27 PROCEDURE — 36415 COLL VENOUS BLD VENIPUNCTURE: CPT

## 2021-01-27 PROCEDURE — 1036F TOBACCO NON-USER: CPT | Performed by: FAMILY MEDICINE

## 2021-01-27 PROCEDURE — 80061 LIPID PANEL: CPT

## 2021-01-27 PROCEDURE — G8417 CALC BMI ABV UP PARAM F/U: HCPCS | Performed by: FAMILY MEDICINE

## 2021-01-27 PROCEDURE — 82565 ASSAY OF CREATININE: CPT | Performed by: FAMILY MEDICINE

## 2021-01-27 PROCEDURE — 86803 HEPATITIS C AB TEST: CPT

## 2021-01-27 PROCEDURE — 80061 LIPID PANEL: CPT | Performed by: FAMILY MEDICINE

## 2021-01-27 PROCEDURE — 90471 IMMUNIZATION ADMIN: CPT | Performed by: FAMILY MEDICINE

## 2021-01-27 PROCEDURE — 3017F COLORECTAL CA SCREEN DOC REV: CPT | Performed by: FAMILY MEDICINE

## 2021-01-27 PROCEDURE — 82565 ASSAY OF CREATININE: CPT

## 2021-01-27 PROCEDURE — 85025 COMPLETE CBC W/AUTO DIFF WBC: CPT

## 2021-01-27 PROCEDURE — G8484 FLU IMMUNIZE NO ADMIN: HCPCS | Performed by: FAMILY MEDICINE

## 2021-01-27 PROCEDURE — 99214 OFFICE O/P EST MOD 30 MIN: CPT | Performed by: FAMILY MEDICINE

## 2021-01-27 RX ORDER — AMLODIPINE BESYLATE 10 MG/1
TABLET ORAL
Qty: 90 TABLET | Refills: 3 | Status: SHIPPED | OUTPATIENT
Start: 2021-01-27 | End: 2022-03-07 | Stop reason: SDUPTHER

## 2021-01-27 RX ORDER — FOSINOPRIL SODIUM 40 MG/1
TABLET ORAL
Qty: 90 TABLET | Refills: 3 | Status: SHIPPED | OUTPATIENT
Start: 2021-01-27 | End: 2022-03-07 | Stop reason: SDUPTHER

## 2021-01-27 SDOH — ECONOMIC STABILITY: FOOD INSECURITY: WITHIN THE PAST 12 MONTHS, YOU WORRIED THAT YOUR FOOD WOULD RUN OUT BEFORE YOU GOT MONEY TO BUY MORE.: NEVER TRUE

## 2021-01-27 SDOH — ECONOMIC STABILITY: TRANSPORTATION INSECURITY
IN THE PAST 12 MONTHS, HAS LACK OF TRANSPORTATION KEPT YOU FROM MEETINGS, WORK, OR FROM GETTING THINGS NEEDED FOR DAILY LIVING?: NO

## 2021-01-27 SDOH — ECONOMIC STABILITY: TRANSPORTATION INSECURITY
IN THE PAST 12 MONTHS, HAS THE LACK OF TRANSPORTATION KEPT YOU FROM MEDICAL APPOINTMENTS OR FROM GETTING MEDICATIONS?: NO

## 2021-01-27 ASSESSMENT — ENCOUNTER SYMPTOMS
COUGH: 0
NAUSEA: 0
VOMITING: 0
ABDOMINAL PAIN: 0
SHORTNESS OF BREATH: 0
SINUS PAIN: 0

## 2021-01-27 ASSESSMENT — PATIENT HEALTH QUESTIONNAIRE - PHQ9
SUM OF ALL RESPONSES TO PHQ9 QUESTIONS 1 & 2: 0
SUM OF ALL RESPONSES TO PHQ QUESTIONS 1-9: 0
SUM OF ALL RESPONSES TO PHQ QUESTIONS 1-9: 0

## 2021-01-27 NOTE — PROGRESS NOTES
[address] [address]  Dept: [phone]  Dept Fax: [phone]    Wyatt Lopez is a 44 y.o. male who presents today for his medical conditions/complaints as noted below. Wyatt Lopez c/o of Establish Care      HPI:     HPI  Pt here to establish care. Prior patient of Dr Juan Jose Gasca ( retired)  Past history of treatment for HTN, hemochromatosis and asthma    Desires medication refills  Only needing nebulizers in fall per pt around November. Asthma better after move of homes and weight loss. Karen Ji  with much travel per pt. Donating blood couple times per year until covid    BP Readings from Last 3 Encounters:   01/27/21 118/78   03/10/20 132/86   10/05/19 134/72          (goal 120/80)    Past Medical History:   Diagnosis Date    Asthma     GERD (gastroesophageal reflux disease)     Hemochromatosis carrier     Hypertension       Past Surgical History:   Procedure Laterality Date    [facility]         Family History   Problem Relation Age of Onset    Coronary Art Dis Mother     Other Mother         aortic aneurysm    Breast Cancer Mother     Cancer Father        Social History     Tobacco Use    Smoking status: Former Smoker    Smokeless tobacco: Former User     Types: Chew   Substance Use Topics    Alcohol use: No      Prior to Visit Medications    Medication Sig Taking?  Authorizing Provider   ipratropium-albuterol (DUONEB) 0.5-2.5 (3) MG/3ML SOLN nebulizer solution Inhale 3 mLs into the lungs every 6 hours as needed for Shortness of Breath  Mehul Khalil MD   fosinopril (MONOPRIL) 40 MG tablet TAKE 1 TABLET DAILY  Mehul Khalil MD   amLODIPine (NORVASC) 10 MG tablet TAKE 1 TABLET DAILY  Mehul Khalil MD     No Known Allergies    Health Maintenance   Topic Date Due    Hepatitis C screen  1970    HIV screen  02/19/1983  Shingles Vaccine (1 of 2) 09/07/2029 (Originally 2/19/2018)    Potassium monitoring  01/27/2022    Creatinine monitoring  01/27/2022    Lipid screen  01/27/2026    Colon cancer screen colonoscopy  01/25/2029    DTaP/Tdap/Td vaccine (2 - Td) 01/27/2031    Flu vaccine  Completed    Pneumococcal 0-64 years Vaccine  Completed    Hepatitis A vaccine  Aged Out    Hepatitis B vaccine  Aged Out    Hib vaccine  Aged Out    Meningococcal (ACWY) vaccine  Aged Out       Subjective:      Review of Systems   Constitutional: Negative for activity change, appetite change and fatigue. Pt here for medication refills. HENT: Negative for congestion and sinus pain. Eyes: Negative for visual disturbance. Respiratory: Negative for cough and shortness of breath. Cardiovascular: Negative for chest pain, palpitations and leg swelling. Gastrointestinal: Negative for abdominal pain, nausea and vomiting. Genitourinary: Negative for dysuria and frequency. Musculoskeletal: Negative for arthralgias and myalgias. Neurological: Negative for dizziness. Psychiatric/Behavioral: Negative for confusion and sleep disturbance. The patient is not nervous/anxious. Objective:     /78 (Site: Left Upper Arm, Position: Sitting, Cuff Size: Large Adult)   Pulse 79   Ht 5' 8\" (1.727 m)   Wt 274 lb (124.3 kg)   SpO2 98%   BMI 41.66 kg/m²     Physical Exam  Vitals signs reviewed. Constitutional:       General: He is not in acute distress. Appearance: He is well-developed. HENT:      Head: Normocephalic and atraumatic. Eyes:      Conjunctiva/sclera: Conjunctivae normal.   Neck:      Musculoskeletal: Neck supple. Thyroid: No thyromegaly. Vascular: No carotid bruit. Cardiovascular:      Rate and Rhythm: Normal rate and regular rhythm. Heart sounds: No murmur. Pulmonary:      Effort: Pulmonary effort is normal.      Breath sounds: Normal breath sounds.    Abdominal: General: Bowel sounds are normal.      Palpations: Abdomen is soft. Musculoskeletal:         General: No swelling (BLE). Right lower leg: He exhibits no swelling. Left lower leg: He exhibits no swelling. Lymphadenopathy:      Cervical: No cervical adenopathy. Neurological:      Mental Status: He is alert and oriented to person, place, and time. Psychiatric:         Mood and Affect: Mood normal.         Thought Content: Thought content normal.         Judgment: Judgment normal.       The 10-year ASCVD risk score (Naman Chen, et al., 2013) is: 4.4%    Values used to calculate the score:      Age: 44 years      Sex: Male      Is Non- : No      Diabetic: No      Tobacco smoker: No      Systolic Blood Pressure: 617 mmHg      Is BP treated: Yes      HDL Cholesterol: 48 mg/dL      Total Cholesterol: 197 mg/dL    Assessment:     1. Essential hypertension    2. Hemochromatosis carrier    3. Mild intermittent asthma without complication    4. Encounter for hepatitis C screening test for low risk patient    5. Hypertriglyceridemia    6. Screening, lipid    7. BMI 40.0-44.9, adult ([facility] [address]    8. Need for diphtheria-tetanus-pertussis (Tdap) vaccine      No results found for this visit on 01/27/21.       Plan:     Orders Placed This Encounter   Procedures    Tdap (age 6y and older) IM (Boostrix)    Electrolyte Panel     Standing Status:   Future     Number of Occurrences:   1     Standing Expiration Date:   1/27/2022    Creatinine, Serum     Standing Status:   Future     Number of Occurrences:   1     Standing Expiration Date:   1/27/2022    Hepatitis C Antibody     Standing Status:   Future     Number of Occurrences:   1     Standing Expiration Date:   1/27/2022    Lipid Panel     Standing Status:   Future     Number of Occurrences:   1     Standing Expiration Date:   1/27/2022     Order Specific Question:   Is Patient Fasting?/# of Hours     Answer:   10-12  CBC Auto Differential     Standing Status:   Future     Number of Occurrences:   1     Standing Expiration Date:   1/27/2022    Ferritin     Standing Status:   Future     Number of Occurrences:   1     Standing Expiration Date:   1/27/2022       Return in about 1 year (around 1/27/2022) for HTN, carrier hemochromatosis. Patient Instructions   Encourage 30-50 # weight loss  May consider donating blood up to 4 times per year to help control ferritin   Discussed use, benefit, and side effects of prescribed medications. All patient questions answered. Pt voiced understanding. Reviewed health maintenance encourage Tdap. Instructed to continue current medications, diet and exercise. Patient agreed with treatment plan. Follow up as directed.      Electronically signed by Christopher Atkins MD on 1/27/2021

## 2021-01-27 NOTE — PATIENT INSTRUCTIONS
Encourage 30-50 # weight loss  May consider donating blood up to 4 times per year to help control ferritin

## 2021-01-28 NOTE — RESULT ENCOUNTER NOTE
Noted, may review at planned f/u appt  The 10-year ASCVD risk score (Kirsten Mari et al., 2013) is: 4.4%    Values used to calculate the score:      Age: 44 years      Sex: Male      Is Non- : No      Diabetic: No      Tobacco smoker: No      Systolic Blood Pressure: 621 mmHg      Is BP treated: Yes      HDL Cholesterol: 48 mg/dL      Total Cholesterol: 197 mg/dL

## 2022-03-06 SDOH — HEALTH STABILITY: PHYSICAL HEALTH: ON AVERAGE, HOW MANY MINUTES DO YOU ENGAGE IN EXERCISE AT THIS LEVEL?: 0 MIN

## 2022-03-06 SDOH — HEALTH STABILITY: PHYSICAL HEALTH: ON AVERAGE, HOW MANY DAYS PER WEEK DO YOU ENGAGE IN MODERATE TO STRENUOUS EXERCISE (LIKE A BRISK WALK)?: 3 DAYS

## 2022-03-07 ENCOUNTER — OFFICE VISIT (OUTPATIENT)
Dept: FAMILY MEDICINE CLINIC | Age: 52
End: 2022-03-07
Payer: COMMERCIAL

## 2022-03-07 VITALS
HEIGHT: 69 IN | DIASTOLIC BLOOD PRESSURE: 88 MMHG | TEMPERATURE: 97.7 F | RESPIRATION RATE: 16 BRPM | OXYGEN SATURATION: 97 % | WEIGHT: 277.5 LBS | BODY MASS INDEX: 41.1 KG/M2 | SYSTOLIC BLOOD PRESSURE: 136 MMHG | HEART RATE: 77 BPM

## 2022-03-07 DIAGNOSIS — Z14.8 HEMOCHROMATOSIS CARRIER: ICD-10-CM

## 2022-03-07 DIAGNOSIS — L98.9 GENERALIZED SKIN LESIONS: ICD-10-CM

## 2022-03-07 DIAGNOSIS — Z76.89 ENCOUNTER TO ESTABLISH CARE: Primary | ICD-10-CM

## 2022-03-07 DIAGNOSIS — I10 ESSENTIAL HYPERTENSION: ICD-10-CM

## 2022-03-07 DIAGNOSIS — E83.110 HEREDITARY HEMOCHROMATOSIS (HCC): ICD-10-CM

## 2022-03-07 DIAGNOSIS — E78.1 HYPERTRIGLYCERIDEMIA: ICD-10-CM

## 2022-03-07 DIAGNOSIS — K21.9 GASTROESOPHAGEAL REFLUX DISEASE WITHOUT ESOPHAGITIS: ICD-10-CM

## 2022-03-07 DIAGNOSIS — J45.30 MILD PERSISTENT ASTHMA WITHOUT COMPLICATION: ICD-10-CM

## 2022-03-07 LAB
ALBUMIN/GLOBULIN RATIO: 1.5 G/DL
ALBUMIN: 4.3 G/DL (ref 3.5–5)
ALP BLD-CCNC: 90 UNITS/L (ref 38–126)
ALT SERPL-CCNC: 35 UNITS/L (ref 4–50)
ANION GAP SERPL CALCULATED.3IONS-SCNC: 7 MMOL/L
AST SERPL-CCNC: 30 UNITS/L (ref 17–59)
BASOPHILS %: 2.15 (ref 0–3)
BASOPHILS ABSOLUTE: 0.11 (ref 0–0.3)
BILIRUB SERPL-MCNC: 0.7 MG/DL (ref 0.2–1.3)
BUN BLDV-MCNC: 15 MG/DL (ref 9–20)
CALCIUM SERPL-MCNC: 9.1 MG/DL (ref 8.4–10.2)
CHLORIDE BLD-SCNC: 106 MMOL/L (ref 98–120)
CHOLESTEROL/HDL RATIO: 4.93 RATIO (ref 0–4.5)
CHOLESTEROL: 212 MG/DL (ref 50–200)
CO2: 25 MMOL/L (ref 22–31)
CREAT SERPL-MCNC: 0.6 MG/DL (ref 0.7–1.3)
CREATININE, RANDOM URINE: 77.3 MG/DL (ref 20–370)
EOSINOPHILS %: 5.45 (ref 0–10)
EOSINOPHILS ABSOLUTE: 0.27 (ref 0–1.1)
FERRITIN: 143 NG/DL (ref 16–323)
GFR CALCULATED: > 60
GLOBULIN: 2.9 G/DL
GLUCOSE: 84 MG/DL (ref 75–110)
HCT VFR BLD CALC: 46.4 % (ref 42–52)
HDLC SERPL-MCNC: 43 MG/DL (ref 36–68)
HEMOGLOBIN: 14.3 (ref 13.8–17.8)
IRON SATURATION: 54 % (ref 15–38)
IRON: 157 MG/DL (ref 49–181)
LDL CHOLESTEROL CALCULATED: 138.4 MG/DL (ref 0–160)
LYMPHOCYTE %: 37.85 (ref 20–51.1)
LYMPHOCYTES ABSOLUTE: 1.88 (ref 1–5.5)
MCH RBC QN AUTO: 28.8 PG (ref 28.5–32.5)
MCHC RBC AUTO-ENTMCNC: 30.8 G/DL (ref 32–37)
MCV RBC AUTO: 93.7 FL (ref 80–94)
MICROALBUMIN UR-MCNC: < 0.6 MG/DL (ref 0–1.7)
MONOCYTES %: 8.97 (ref 1.7–9.3)
MONOCYTES ABSOLUTE: 0.45 (ref 0.1–1)
NEUTROPHILS %: 45.59 (ref 42.2–75.2)
NEUTROPHILS ABSOLUTE: 2.27 (ref 2–8.1)
PDW BLD-RTO: 13.6 % (ref 10–15.5)
PLATELET # BLD: 235.1 THOU/MM3 (ref 130–400)
POTASSIUM SERPL-SCNC: 4.4 MMOL/L (ref 3.6–5)
RBC: 4.95 M/UL (ref 4.7–6.1)
SODIUM BLD-SCNC: 139 MMOL/L (ref 135–145)
TOTAL IRON BINDING CAPACITY: 290 MG/DL (ref 261–497)
TOTAL PROTEIN, SERUM: 7.2 G/DL (ref 6.3–8.2)
TRIGL SERPL-MCNC: 153 MG/DL (ref 10–250)
VLDLC SERPL CALC-MCNC: 31 MG/DL (ref 0–50)
WBC: 5 THOU/ML3 (ref 4.8–10.8)

## 2022-03-07 PROCEDURE — 99213 OFFICE O/P EST LOW 20 MIN: CPT

## 2022-03-07 PROCEDURE — G8427 DOCREV CUR MEDS BY ELIG CLIN: HCPCS | Performed by: NURSE PRACTITIONER

## 2022-03-07 PROCEDURE — 99214 OFFICE O/P EST MOD 30 MIN: CPT | Performed by: NURSE PRACTITIONER

## 2022-03-07 PROCEDURE — 3017F COLORECTAL CA SCREEN DOC REV: CPT | Performed by: NURSE PRACTITIONER

## 2022-03-07 PROCEDURE — G8482 FLU IMMUNIZE ORDER/ADMIN: HCPCS | Performed by: NURSE PRACTITIONER

## 2022-03-07 PROCEDURE — G8417 CALC BMI ABV UP PARAM F/U: HCPCS | Performed by: NURSE PRACTITIONER

## 2022-03-07 PROCEDURE — 1036F TOBACCO NON-USER: CPT | Performed by: NURSE PRACTITIONER

## 2022-03-07 RX ORDER — AMLODIPINE BESYLATE 10 MG/1
TABLET ORAL
Qty: 90 TABLET | Refills: 3 | Status: SHIPPED | OUTPATIENT
Start: 2022-03-07

## 2022-03-07 RX ORDER — FOSINOPRIL SODIUM 40 MG/1
TABLET ORAL
Qty: 90 TABLET | Refills: 3 | Status: SHIPPED | OUTPATIENT
Start: 2022-03-07

## 2022-03-07 RX ORDER — ALBUTEROL SULFATE 90 UG/1
2 AEROSOL, METERED RESPIRATORY (INHALATION) EVERY 4 HOURS PRN
Qty: 18 G | Refills: 0 | Status: SHIPPED | OUTPATIENT
Start: 2022-03-07

## 2022-03-07 ASSESSMENT — ENCOUNTER SYMPTOMS
CONSTIPATION: 0
ORTHOPNEA: 0
EYES NEGATIVE: 1
WHEEZING: 0
SHORTNESS OF BREATH: 0
ABDOMINAL PAIN: 0
DIARRHEA: 0
COUGH: 0

## 2022-03-07 ASSESSMENT — PATIENT HEALTH QUESTIONNAIRE - PHQ9
SUM OF ALL RESPONSES TO PHQ QUESTIONS 1-9: 0
1. LITTLE INTEREST OR PLEASURE IN DOING THINGS: 0
SUM OF ALL RESPONSES TO PHQ QUESTIONS 1-9: 0
SUM OF ALL RESPONSES TO PHQ QUESTIONS 1-9: 0
2. FEELING DOWN, DEPRESSED OR HOPELESS: 0
SUM OF ALL RESPONSES TO PHQ QUESTIONS 1-9: 0
SUM OF ALL RESPONSES TO PHQ9 QUESTIONS 1 & 2: 0

## 2022-03-07 NOTE — PROGRESS NOTES
[address] [facility]  [address]  Dept: [phone]  Dept Fax: [phone]    Patient:  Royal Interiano  YOB: 1970  Date of Service:  3/7/2022    Subjective:   Royal Interiano (:  1970) is a 45 y.o. male, New patient, here for evaluation of the following chief complaint(s):    Chief Complaint   Patient presents with   [facility].     Patient is here to establish a PCP.  Hypertension      Patient reports he is a carrier of the hemochromatosis gene. He has brothers that are affected by this and they give blood to help with iron levels. Patient states he has not been symptomatic, but also gives blood to make sure he stays in a good range. Also has intermittent heartburn. He used to take apple cider vinegar which seemed to help. Currently he is using Pepcid as needed. He has a history of asthma. States he only requires the dulera 1-2 times a year. He follows with Dr. Martina Santana for sleep apnea. He wears the cpap faithfully at night. He does not have a rescue inhaler. He is requesting a referral to dermatology for general skin monitoring. There are no concerning lesions at this time. Hypertension  This is a chronic problem. The current episode started more than 1 year ago. The problem is controlled. Pertinent negatives include no anxiety, chest pain, headaches, malaise/fatigue, orthopnea, palpitations, peripheral edema, PND or shortness of breath. Risk factors for coronary artery disease include male gender, sedentary lifestyle, dyslipidemia and family history. Past treatments include calcium channel blockers and ACE inhibitors. The current treatment provides moderate improvement. Compliance problems include exercise and diet. Identifiable causes of hypertension include sleep apnea.        The 10-year ASCVD risk score (Connie Lutz., et al., 2013) is: 6.8%    Values used to calculate the score:      Age: 45 years Sex: Male      Is Non- : No      Diabetic: No      Tobacco smoker: No      Systolic Blood Pressure: 979 mmHg      Is BP treated: Yes      HDL Cholesterol: 48 mg/dL      Total Cholesterol: 197 mg/dL     BP Readings from Last 3 Encounters:   03/07/22 136/88   01/27/21 118/78   03/10/20 132/86      Pulse Readings from Last 3 Encounters:   03/07/22 77   01/27/21 79   03/10/20 70      Wt Readings from Last 3 Encounters:   03/07/22 277 lb 8 oz (125.9 kg)   01/27/21 274 lb (124.3 kg)   03/10/20 274 lb (124.3 kg)        Preventive Care:     Health Maintenance   Topic Date Due    HIV screen  Never done    Potassium monitoring  01/27/2022    Creatinine monitoring  01/27/2022    Shingles Vaccine (1 of 2) 09/07/2029 (Originally 2/19/2018)    Depression Screen  03/07/2023    Diabetes screen  03/10/2023    Lipid screen  01/27/2026    Colorectal Cancer Screen  01/25/2029    DTaP/Tdap/Td vaccine (2 - Td or Tdap) 01/27/2031    Pneumococcal 0-64 years Vaccine (2 of 2 - PPSV23) 02/19/2033    Flu vaccine  Completed    COVID-19 Vaccine  Completed    Hepatitis C screen  Completed    Hepatitis A vaccine  Aged Out    Hepatitis B vaccine  Aged Out    Hib vaccine  Aged Out    Meningococcal (ACWY) vaccine  Aged Out        Eye exam in past 6-12 months: has appointment this afternoon  Dental exam in past 6-12 months: Yes  Exercise:  No  Seatbelt use:  Yes  Lipid panel:    Lab Results   Component Value Date    CHOL 197 01/27/2021    TRIG 147 01/27/2021    HDL 48 01/27/2021    LDLCALC 116.4 12/22/2018       No Known Allergies    Current Outpatient Medications   Medication Sig Dispense Refill    albuterol sulfate  (90 Base) MCG/ACT inhaler Inhale 2 puffs into the lungs every 4 hours as needed for Wheezing or Shortness of Breath 18 g 0    amLODIPine (NORVASC) 10 MG tablet TAKE 1 TABLET DAILY 90 tablet 3    fosinopril (MONOPRIL) 40 MG tablet TAKE 1 TABLET DAILY 90 tablet 3     No current facility-administered medications for this visit. Past Medical History:   Diagnosis Date    Asthma     GERD (gastroesophageal reflux disease)     Hemochromatosis carrier     Hypertension        Past Surgical History:   Procedure Laterality Date    COLONOSCOPY  01/25/2019    Dr. Eugenio Stahl at [address] [facility]         Family History   Problem Relation Age of Onset    Coronary Art Dis Mother     Other Mother         aortic aneurysm    Breast Cancer Mother     Cancer Father        Social History     Tobacco Use    Smoking status: Former Smoker    Smokeless tobacco: Former User     Types: Chew   Substance Use Topics    Alcohol use: No    Drug use: Not on file       Review of Systems:     Review of Systems   Constitutional: Negative for appetite change, chills, fatigue, fever and malaise/fatigue. HENT: Negative. Eyes: Negative. Respiratory: Negative for cough, shortness of breath and wheezing. Cardiovascular: Negative for chest pain, palpitations, orthopnea, leg swelling and PND. Gastrointestinal: Negative for abdominal pain, constipation and diarrhea. Endocrine: Negative for cold intolerance, heat intolerance, polydipsia, polyphagia and polyuria. Genitourinary: Negative. Musculoskeletal: Negative for arthralgias and myalgias. Skin: Negative. Allergic/Immunologic: Negative for environmental allergies and food allergies. Neurological: Negative for dizziness, weakness, light-headedness and headaches. Psychiatric/Behavioral: Negative for agitation, dysphoric mood, self-injury, sleep disturbance and suicidal ideas. The patient is not nervous/anxious.            PHQ Scores 3/7/2022 1/27/2021 3/10/2020 10/5/2019 12/20/2018   PHQ2 Score 0 0 0 0 0   PHQ9 Score 0 0 0 0 0     Interpretation of Total Score Depression Severity: 1-4 = Minimal depression, 5-9 = Mild depression, 10-14 = Moderate depression, 15-19 = Moderately severe depression, 20-27 = Severe depression Physical Exam:     Vitals:    03/07/22 0820   BP: 136/88   Site: Left Upper Arm   Position: Sitting   Cuff Size: Medium Adult   Pulse: 77   Resp: 16   Temp: 97.7 °F (36.5 °C)   TempSrc: Temporal   SpO2: 97%   Weight: 277 lb 8 oz (125.9 kg)   Height: 5' 9\" (1.753 m)     Body mass index is 40.98 kg/m². Physical Exam  Constitutional:       Appearance: Normal appearance. He is well-developed and well-groomed. He is obese. HENT:      Head: Normocephalic. Eyes:      Conjunctiva/sclera: Conjunctivae normal.   Neck:      Thyroid: No thyromegaly. Vascular: No carotid bruit. Cardiovascular:      Rate and Rhythm: Normal rate and regular rhythm. Heart sounds: Normal heart sounds. Pulmonary:      Effort: Pulmonary effort is normal.      Breath sounds: Normal breath sounds. No wheezing. Abdominal:      General: Bowel sounds are normal.      Palpations: Abdomen is soft. Tenderness: There is no abdominal tenderness. Musculoskeletal:      Cervical back: Neck supple. Right lower leg: No edema. Left lower leg: No edema. Lymphadenopathy:      Cervical: No cervical adenopathy. Skin:     Capillary Refill: Capillary refill takes less than 2 seconds. Neurological:      Mental Status: He is alert and oriented to person, place, and time. Gait: Gait normal.   Psychiatric:         Mood and Affect: Mood normal.         Behavior: Behavior is cooperative. Assessment/Plan:   1. Encounter to establish care  2. Essential hypertension  -     amLODIPine (NORVASC) 10 MG tablet; TAKE 1 TABLET DAILY, Disp-90 tablet, R-3Normal  -     fosinopril (MONOPRIL) 40 MG tablet; TAKE 1 TABLET DAILY, Disp-90 tablet, R-3Normal  -     Microalbumin, Ur; Future  -     Lipid, Fasting; Future  -     Comprehensive Metabolic Panel; Future  3. Hypertriglyceridemia  -     Lipid, Fasting; Future  4. Mild persistent asthma without complication  -     albuterol sulfate  (90 Base) MCG/ACT inhaler;  Inhale 2 puffs into the lungs every 4 hours as needed for Wheezing or Shortness of Breath, Disp-18 g, R-0Normal  -     CBC with Auto Differential; Future  5. Gastroesophageal reflux disease without esophagitis  -     Comprehensive Metabolic Panel; Future  6. Hereditary hemochromatosis ([facility] [address]  7. Hemochromatosis carrier  -     CBC with Auto Differential; Future  -     Ferritin; Future  -     Iron and TIBC; Future  8. Generalized skin lesions  -     External Referral To Dermatology       Encouraged healthy diet and routine exercise. Instructed to continue current medications. All patient questions answered. Patient voiced understanding. Patient agreed with treatment plan. Return if symptoms worsen or fail to improve. Please note that this chart was generated using voice recognition Dragon dictation software. Although every effort was made to ensure the accuracy of this automated transcription, some errors in transcription may have occurred.     Electronically signed by SOCRATES Yip CNP on 3/7/2022 at 12:38 PM. bed rails

## 2022-03-07 NOTE — PATIENT INSTRUCTIONS
Patient Education        High-Fiber Diet: Care Instructions  Overview     A high-fiber diet may help you relieve constipation and feel less bloated. Your doctor and dietitian will help you make a high-fiber eating plan based on your personal needs. The plan will include the things you like to eat. It will also make sure that you get 25 to 35 grams of fiber a day. Before you make changes to the way you eat, be sure to talk with your doctor or dietitian. Follow-up care is a key part of your treatment and safety. Be sure to make and go to all appointments, and call your doctor if you are having problems. It's also a good idea to know your test results and keep a list of the medicines you take. How can you care for yourself at home? · You can increase how much fiber you get if you eat more of certain foods. These foods include:  ? Whole-grain breads and cereals. ? Fruits, such as pears, apples, and peaches. Eat the skins and peels if you can.  ? Vegetables, such as broccoli, cabbage, spinach, carrots, asparagus, and squash. ? Starchy vegetables. These include potatoes with skins, kidney beans, and lima beans. · Take a fiber supplement every day if your doctor recommends it. Examples are Benefiber, Citrucel, FiberCon, and Metamucil. Ask your doctor how much to take. · Drink plenty of fluids. If you have kidney, heart, or liver disease and have to limit fluids, talk with your doctor before you increase the amount of fluids you drink. Where can you learn more? Go to https://[employer]. org and sign in to your [employer] account. Enter I455 in the [facility] box to learn more about \"High-Fiber Diet: Care Instructions. \"     If you do not have an account, please click on the \"Sign Up Now\" link. Current as of: September 8, 2021               Content Version: 13.1  © 1445-9618 Healthwise, Incorporated. Care instructions adapted under license by [facility] ([facility]).  If you have questions about a medical condition or this instruction, always ask your healthcare professional. [place] [address] any warranty or liability for your use of this information.

## 2023-02-08 SDOH — ECONOMIC STABILITY: FOOD INSECURITY: WITHIN THE PAST 12 MONTHS, THE FOOD YOU BOUGHT JUST DIDN'T LAST AND YOU DIDN'T HAVE MONEY TO GET MORE.: NEVER TRUE

## 2023-02-08 SDOH — ECONOMIC STABILITY: INCOME INSECURITY: HOW HARD IS IT FOR YOU TO PAY FOR THE VERY BASICS LIKE FOOD, HOUSING, MEDICAL CARE, AND HEATING?: NOT HARD AT ALL

## 2023-02-08 SDOH — ECONOMIC STABILITY: HOUSING INSECURITY
IN THE LAST 12 MONTHS, WAS THERE A TIME WHEN YOU DID NOT HAVE A STEADY PLACE TO SLEEP OR SLEPT IN A SHELTER (INCLUDING NOW)?: NO

## 2023-02-08 SDOH — ECONOMIC STABILITY: FOOD INSECURITY: WITHIN THE PAST 12 MONTHS, YOU WORRIED THAT YOUR FOOD WOULD RUN OUT BEFORE YOU GOT MONEY TO BUY MORE.: NEVER TRUE

## 2023-02-09 ENCOUNTER — OFFICE VISIT (OUTPATIENT)
Dept: FAMILY MEDICINE CLINIC | Age: 53
End: 2023-02-09
Payer: COMMERCIAL

## 2023-02-09 VITALS
WEIGHT: 258.2 LBS | OXYGEN SATURATION: 98 % | BODY MASS INDEX: 38.13 KG/M2 | HEART RATE: 68 BPM | DIASTOLIC BLOOD PRESSURE: 88 MMHG | SYSTOLIC BLOOD PRESSURE: 138 MMHG

## 2023-02-09 DIAGNOSIS — E83.110 HEREDITARY HEMOCHROMATOSIS (HCC): ICD-10-CM

## 2023-02-09 DIAGNOSIS — G47.30 SLEEP APNEA IN ADULT: ICD-10-CM

## 2023-02-09 DIAGNOSIS — L30.9 DERMATITIS: ICD-10-CM

## 2023-02-09 DIAGNOSIS — I10 ESSENTIAL HYPERTENSION: ICD-10-CM

## 2023-02-09 DIAGNOSIS — K21.9 GASTROESOPHAGEAL REFLUX DISEASE WITHOUT ESOPHAGITIS: ICD-10-CM

## 2023-02-09 DIAGNOSIS — Z00.00 ENCOUNTER FOR WELLNESS EXAMINATION IN ADULT: Primary | ICD-10-CM

## 2023-02-09 DIAGNOSIS — E78.1 HYPERTRIGLYCERIDEMIA: ICD-10-CM

## 2023-02-09 DIAGNOSIS — E66.01 CLASS 2 SEVERE OBESITY DUE TO EXCESS CALORIES WITH SERIOUS COMORBIDITY AND BODY MASS INDEX (BMI) OF 38.0 TO 38.9 IN ADULT (HCC): ICD-10-CM

## 2023-02-09 DIAGNOSIS — J45.30 MILD PERSISTENT ASTHMA WITHOUT COMPLICATION: ICD-10-CM

## 2023-02-09 PROBLEM — L98.9 GENERALIZED SKIN LESIONS: Status: RESOLVED | Noted: 2022-03-07 | Resolved: 2023-02-09

## 2023-02-09 PROBLEM — G47.10 EXCESSIVE SOMNOLENCE DISORDER: Status: RESOLVED | Noted: 2017-12-20 | Resolved: 2023-02-09

## 2023-02-09 PROBLEM — Z14.8 CARRIER OF HEMOCHROMATOSIS HFE GENE MUTATION: Status: RESOLVED | Noted: 2018-12-22 | Resolved: 2023-02-09

## 2023-02-09 PROBLEM — E66.812 CLASS 2 SEVERE OBESITY DUE TO EXCESS CALORIES WITH SERIOUS COMORBIDITY AND BODY MASS INDEX (BMI) OF 38.0 TO 38.9 IN ADULT: Status: ACTIVE | Noted: 2021-01-27

## 2023-02-09 PROBLEM — Z14.8 HEMOCHROMATOSIS CARRIER: Status: RESOLVED | Noted: 2017-12-20 | Resolved: 2023-02-09

## 2023-02-09 PROCEDURE — 3075F SYST BP GE 130 - 139MM HG: CPT | Performed by: NURSE PRACTITIONER

## 2023-02-09 PROCEDURE — 3079F DIAST BP 80-89 MM HG: CPT | Performed by: NURSE PRACTITIONER

## 2023-02-09 PROCEDURE — 99396 PREV VISIT EST AGE 40-64: CPT | Performed by: NURSE PRACTITIONER

## 2023-02-09 RX ORDER — AMLODIPINE BESYLATE 10 MG/1
TABLET ORAL
Qty: 90 TABLET | Refills: 3 | Status: SHIPPED | OUTPATIENT
Start: 2023-02-09

## 2023-02-09 RX ORDER — TRIAMCINOLONE ACETONIDE 1 MG/G
CREAM TOPICAL
Qty: 45 G | Refills: 0 | Status: SHIPPED | OUTPATIENT
Start: 2023-02-09

## 2023-02-09 RX ORDER — FOSINOPRIL SODIUM 40 MG/1
TABLET ORAL
Qty: 90 TABLET | Refills: 3 | Status: SHIPPED | OUTPATIENT
Start: 2023-02-09

## 2023-02-09 ASSESSMENT — ENCOUNTER SYMPTOMS
CONSTIPATION: 0
NAUSEA: 0
DIARRHEA: 0
EYES NEGATIVE: 1
SHORTNESS OF BREATH: 0
WHEEZING: 0
COUGH: 0
ABDOMINAL PAIN: 0

## 2023-02-09 ASSESSMENT — PATIENT HEALTH QUESTIONNAIRE - PHQ9
2. FEELING DOWN, DEPRESSED OR HOPELESS: 0
SUM OF ALL RESPONSES TO PHQ9 QUESTIONS 1 & 2: 0
SUM OF ALL RESPONSES TO PHQ QUESTIONS 1-9: 0
1. LITTLE INTEREST OR PLEASURE IN DOING THINGS: 0
SUM OF ALL RESPONSES TO PHQ QUESTIONS 1-9: 0

## 2023-02-09 NOTE — PROGRESS NOTES
[address] [address] [address]  Dept: [phone]  Dept Fax: [phone]    Date of Service:  2023    Giuliano Felder (:  1970) is a 45 y.o. male, here for evaluation of the following chief complaint(s):    Chief Complaint   Patient presents with    Hypertension    Gastroesophageal Reflux    Hyperlipidemia        Assessment/Plan:   1. Encounter for wellness examination in adult  -     Comprehensive Metabolic Panel; Future  -     Lipid, Fasting; Future  -     CBC with Auto Differential; Future  -     Microalbumin, Ur; Future  -     amLODIPine (NORVASC) 10 MG tablet; TAKE 1 TABLET DAILY, Disp-90 tablet, R-3Normal  -     fosinopril (MONOPRIL) 40 MG tablet; TAKE 1 TABLET DAILY, Disp-90 tablet, R-3Normal  -     Iron and TIBC; Future  -     Ferritin; Future  2. Essential hypertension  Assessment & Plan:   Borderline controlled, continue current medications, medication adherence emphasized and lifestyle modifications recommended. Instructed patient to monitor blood pressure at home and keep a log. Increase water and activity level, decrease salt in diet. Orders:  -     Comprehensive Metabolic Panel; Future  -     Lipid, Fasting; Future  -     CBC with Auto Differential; Future  -     Microalbumin, Ur; Future  -     amLODIPine (NORVASC) 10 MG tablet; TAKE 1 TABLET DAILY, Disp-90 tablet, R-3Normal  -     fosinopril (MONOPRIL) 40 MG tablet; TAKE 1 TABLET DAILY, Disp-90 tablet, R-3Normal  3. Mild persistent asthma without complication  Assessment & Plan:   Well-controlled, continue current medications  Orders:  -     Comprehensive Metabolic Panel; Future  -     CBC with Auto Differential; Future  4. Gastroesophageal reflux disease without esophagitis  Assessment & Plan:   Well-controlled, lifestyle modifications recommended and controlled with diet. Orders:  -     Comprehensive Metabolic Panel;  Future  -     CBC with Auto Differential; Future  5. Hypertriglyceridemia  -     Lipid, Fasting; Future  6. Dermatitis  -     triamcinolone (KENALOG) 0.1 % cream; Apply topically 2 times daily. , Disp-45 g, R-0, Normal  7. Hereditary hemochromatosis ([address]  -     CBC with Auto Differential; Future  -     Iron and TIBC; Future  -     Ferritin; Future  8. Class 2 severe obesity due to excess calories with serious comorbidity and body mass index (BMI) of 38.0 to 38.9 in adult [facility])  Assessment & Plan:   Uncontrolled, lifestyle modifications recommended  9. Sleep apnea in adult  Assessment & Plan:   Monitored by specialist- no acute findings meriting change in the plan. Uses cpap faithfully at night and feels it is helpful. He feel well rested and reports doing well. All patient questions answered. Patient voiced understanding. Instructed to continue current medications. Patient agreed with treatment plan. Follow up as directed. Return in about 6 months (around 8/9/2023). Subjective:   Monitors blood pressure at home and states well controlled. Has not needed the inhaler in over 1 year. He uses Apple cider vinegar and feels it is helpful for controlling GERD. He has an area to the right lower leg he would like looked at. It was fist noticed a few months ago. It initially looked like a wart, but now he is not sure. He used otc wart treatment and tried scaling it off without success. He denies pain, swelling, itching or burning. Hypertension  This is a chronic problem. The current episode started more than 1 year ago. The problem is controlled. Pertinent negatives include no anxiety, chest pain, headaches, malaise/fatigue, orthopnea, palpitations, peripheral edema, PND or shortness of breath. Risk factors for coronary artery disease include male gender, sedentary lifestyle, dyslipidemia and family history. Past treatments include calcium channel blockers and ACE inhibitors. The current treatment provides moderate improvement.  Compliance problems include exercise and diet. Identifiable causes of hypertension include sleep apnea. BP Readings from Last 3 Encounters:   02/09/23 138/88   03/07/22 136/88   01/27/21 118/78      Pulse Readings from Last 3 Encounters:   02/09/23 68   03/07/22 77   01/27/21 79      Wt Readings from Last 3 Encounters:   02/09/23 258 lb 3.2 oz (117.1 kg)   03/07/22 277 lb 8 oz (125.9 kg)   01/27/21 274 lb (124.3 kg)        Current Outpatient Medications   Medication Sig Dispense Refill    amLODIPine (NORVASC) 10 MG tablet TAKE 1 TABLET DAILY 90 tablet 3    fosinopril (MONOPRIL) 40 MG tablet TAKE 1 TABLET DAILY 90 tablet 3    triamcinolone (KENALOG) 0.1 % cream Apply topically 2 times daily. 45 g 0    albuterol sulfate  (90 Base) MCG/ACT inhaler Inhale 2 puffs into the lungs every 4 hours as needed for Wheezing or Shortness of Breath 18 g 0     No current facility-administered medications for this visit. Past Medical History:   Diagnosis Date    Asthma     GERD (gastroesophageal reflux disease)     Hemochromatosis carrier     Hypertension        Past Surgical History:   Procedure Laterality Date    COLONOSCOPY  01/25/2019    Dr. Heike Curry at [address] History     Tobacco Use    Smoking status: Former    Smokeless tobacco: Former     Types: Chew   Substance Use Topics    Alcohol use: No      Family History   Problem Relation Age of Onset    Coronary Art Dis Mother     Other Mother         aortic aneurysm    Breast Cancer Mother     Cancer Father        Review of Systems:     Review of Systems   Constitutional:  Negative for appetite change, chills, fatigue and fever. HENT: Negative. Eyes: Negative. Respiratory:  Negative for cough, shortness of breath and wheezing. Cardiovascular:  Negative for chest pain, palpitations and leg swelling. Gastrointestinal:  Negative for abdominal pain, constipation, diarrhea and nausea.    Endocrine: Negative for cold intolerance, heat intolerance, polydipsia, polyphagia and polyuria. Genitourinary: Negative. Musculoskeletal:  Negative for arthralgias and myalgias. Skin:  Positive for rash (right leg). Neurological:  Negative for dizziness, light-headedness and headaches. Psychiatric/Behavioral:  Negative for agitation, dysphoric mood, self-injury, sleep disturbance and suicidal ideas. The patient is not nervous/anxious. PHQ Scores 2/9/2023 3/7/2022 1/27/2021 3/10/2020 10/5/2019 12/20/2018   PHQ2 Score 0 0 0 0 0 0   PHQ9 Score 0 0 0 0 0 0     Interpretation of Total Score Depression Severity: 1-4 = Minimal depression, 5-9 = Mild depression, 10-14 = Moderate depression, 15-19 = Moderately severe depression, 20-27 = Severe depression     Physical Exam:     Vitals:    02/09/23 1206 02/09/23 1242   BP: (!) 142/94 138/88   Pulse: 68    SpO2: 98%    Weight: 258 lb 3.2 oz (117.1 kg)       Body mass index is 38.13 kg/m². Physical Exam  Constitutional:       Appearance: Normal appearance. He is well-developed and well-groomed. He is obese. HENT:      Head: Normocephalic. Eyes:      Conjunctiva/sclera: Conjunctivae normal.   Neck:      Thyroid: No thyromegaly. Vascular: No carotid bruit. Cardiovascular:      Rate and Rhythm: Normal rate and regular rhythm. Heart sounds: Normal heart sounds. Pulmonary:      Effort: Pulmonary effort is normal.      Breath sounds: Normal breath sounds. No wheezing. Musculoskeletal:      Cervical back: Neck supple. Right lower leg: No edema. Left lower leg: No edema. Lymphadenopathy:      Cervical: No cervical adenopathy. Skin:     Capillary Refill: Capillary refill takes less than 2 seconds. Findings: Lesion (2-3 cm lesion RLE) present. Neurological:      Mental Status: He is alert and oriented to person, place, and time. Gait: Gait normal.   Psychiatric:         Mood and Affect: Mood normal.         Behavior: Behavior is cooperative.        Please note that this chart was generated using voice recognition Dragon dictation software.  Although every effort was made to ensure the accuracy of this automated transcription, some errors in transcription may have occurred.    Electronically signed by SOCRATES Ghotra CNP on 2/9/2023

## 2023-02-10 NOTE — ASSESSMENT & PLAN NOTE
Monitored by specialist- no acute findings meriting change in the plan. Uses cpap faithfully at night and feels it is helpful. He feel well rested and reports doing well.

## 2023-02-10 NOTE — ASSESSMENT & PLAN NOTE
Borderline controlled, continue current medications, medication adherence emphasized and lifestyle modifications recommended. Instructed patient to monitor blood pressure at home and keep a log. Increase water and activity level, decrease salt in diet.

## 2023-03-06 DIAGNOSIS — Z00.00 ENCOUNTER FOR WELLNESS EXAMINATION IN ADULT: ICD-10-CM

## 2023-03-06 DIAGNOSIS — J45.30 MILD PERSISTENT ASTHMA WITHOUT COMPLICATION: ICD-10-CM

## 2023-03-06 DIAGNOSIS — E78.1 HYPERTRIGLYCERIDEMIA: ICD-10-CM

## 2023-03-06 DIAGNOSIS — K21.9 GASTROESOPHAGEAL REFLUX DISEASE WITHOUT ESOPHAGITIS: ICD-10-CM

## 2023-03-06 DIAGNOSIS — I10 ESSENTIAL HYPERTENSION: ICD-10-CM

## 2023-03-06 DIAGNOSIS — E83.110 HEREDITARY HEMOCHROMATOSIS (HCC): ICD-10-CM

## 2023-05-16 ENCOUNTER — OFFICE VISIT (OUTPATIENT)
Dept: FAMILY MEDICINE CLINIC | Age: 53
End: 2023-05-16
Payer: COMMERCIAL

## 2023-05-16 VITALS
HEART RATE: 73 BPM | WEIGHT: 260 LBS | SYSTOLIC BLOOD PRESSURE: 122 MMHG | OXYGEN SATURATION: 97 % | DIASTOLIC BLOOD PRESSURE: 78 MMHG | BODY MASS INDEX: 38.51 KG/M2 | HEIGHT: 69 IN

## 2023-05-16 DIAGNOSIS — L72.3 SEBACEOUS CYST: ICD-10-CM

## 2023-05-16 DIAGNOSIS — L57.0 KERATOSIS: ICD-10-CM

## 2023-05-16 PROCEDURE — 99213 OFFICE O/P EST LOW 20 MIN: CPT | Performed by: FAMILY MEDICINE

## 2023-05-16 PROCEDURE — 3074F SYST BP LT 130 MM HG: CPT | Performed by: FAMILY MEDICINE

## 2023-05-16 PROCEDURE — 3078F DIAST BP <80 MM HG: CPT | Performed by: FAMILY MEDICINE

## 2023-05-16 NOTE — PROGRESS NOTES
HPI:  Patient comes in today for   Chief Complaint   Patient presents with    Other     Pt is here for a spot on his right cheek. He states it has always been there but he has noticed it being darker. He also states he has a lump on his upper mid back. He states that has been there for 2 months. He see a skin specialist in [place] but can not get into him until September. Here with c/o dark spot in right cheek has had it for sometime is getting darker sees dermatology for skin care. Also c/o  cyst in the back no pain or drainage. REVIEW OF SYSTEMS:  Cardio: No chest pain, palpitations, KIM, edema, PND  Pulmonary: No cough, hemoptysis, SOB  GI: No nausea, vomiting, dysphagia, odynophagia, diarrhea,constipation  : No dysuria, hematuria, urgency, incontinence  Past Medical History:   Diagnosis Date    Asthma     GERD (gastroesophageal reflux disease)     Hemochromatosis carrier     Hypertension        Current Outpatient Medications   Medication Sig Dispense Refill    amLODIPine (NORVASC) 10 MG tablet TAKE 1 TABLET DAILY 90 tablet 3    fosinopril (MONOPRIL) 40 MG tablet TAKE 1 TABLET DAILY 90 tablet 3    triamcinolone (KENALOG) 0.1 % cream Apply topically 2 times daily. 45 g 0    albuterol sulfate  (90 Base) MCG/ACT inhaler Inhale 2 puffs into the lungs every 4 hours as needed for Wheezing or Shortness of Breath 18 g 0     No current facility-administered medications for this visit. No Known Allergies    PHYSICAL EXAM:  VS:  /78 (Site: Left Upper Arm, Position: Sitting, Cuff Size: Medium Adult)   Pulse 73   Ht 5' 9\" (1.753 m)   Wt 260 lb (117.9 kg)   SpO2 97%   BMI 38.40 kg/m²   General:  Alert and oriented, NAD  HEENT:  TMs, JAMMIE, EOMI, Conjunctivae clear,Throat currently clear.   NECK:  Supple without adenopathy or thyromegaly, no carotid bruits  LUNGS:  CTA all fields  HEART:  RRR without Murmur  EXTREMITIES:  No edema, no calf tenderness  Skin:Has multiple dark keratotic lesions in

## 2023-12-12 DIAGNOSIS — I10 ESSENTIAL HYPERTENSION: ICD-10-CM

## 2023-12-12 DIAGNOSIS — Z00.00 ENCOUNTER FOR WELLNESS EXAMINATION IN ADULT: ICD-10-CM

## 2023-12-12 RX ORDER — AMLODIPINE BESYLATE 10 MG/1
TABLET ORAL
Qty: 90 TABLET | Refills: 0 | Status: SHIPPED | OUTPATIENT
Start: 2023-12-12

## 2023-12-12 RX ORDER — FOSINOPRIL SODIUM 40 MG/1
TABLET ORAL
Qty: 90 TABLET | Refills: 0 | Status: SHIPPED | OUTPATIENT
Start: 2023-12-12

## 2024-04-06 SDOH — ECONOMIC STABILITY: FOOD INSECURITY: WITHIN THE PAST 12 MONTHS, THE FOOD YOU BOUGHT JUST DIDN'T LAST AND YOU DIDN'T HAVE MONEY TO GET MORE.: NEVER TRUE

## 2024-04-06 SDOH — ECONOMIC STABILITY: FOOD INSECURITY: WITHIN THE PAST 12 MONTHS, YOU WORRIED THAT YOUR FOOD WOULD RUN OUT BEFORE YOU GOT MONEY TO BUY MORE.: NEVER TRUE

## 2024-04-06 SDOH — ECONOMIC STABILITY: INCOME INSECURITY: HOW HARD IS IT FOR YOU TO PAY FOR THE VERY BASICS LIKE FOOD, HOUSING, MEDICAL CARE, AND HEATING?: NOT HARD AT ALL

## 2024-04-06 ASSESSMENT — PATIENT HEALTH QUESTIONNAIRE - PHQ9
SUM OF ALL RESPONSES TO PHQ QUESTIONS 1-9: 0
SUM OF ALL RESPONSES TO PHQ QUESTIONS 1-9: 0
2. FEELING DOWN, DEPRESSED OR HOPELESS: NOT AT ALL
2. FEELING DOWN, DEPRESSED OR HOPELESS: NOT AT ALL
SUM OF ALL RESPONSES TO PHQ9 QUESTIONS 1 & 2: 0
SUM OF ALL RESPONSES TO PHQ9 QUESTIONS 1 & 2: 0
1. LITTLE INTEREST OR PLEASURE IN DOING THINGS: NOT AT ALL
SUM OF ALL RESPONSES TO PHQ QUESTIONS 1-9: 0
1. LITTLE INTEREST OR PLEASURE IN DOING THINGS: NOT AT ALL
SUM OF ALL RESPONSES TO PHQ QUESTIONS 1-9: 0

## 2024-04-09 ENCOUNTER — OFFICE VISIT (OUTPATIENT)
Dept: FAMILY MEDICINE CLINIC | Age: 54
End: 2024-04-09
Payer: COMMERCIAL

## 2024-04-09 VITALS
SYSTOLIC BLOOD PRESSURE: 128 MMHG | HEART RATE: 73 BPM | WEIGHT: 254.8 LBS | DIASTOLIC BLOOD PRESSURE: 84 MMHG | OXYGEN SATURATION: 98 % | BODY MASS INDEX: 37.63 KG/M2

## 2024-04-09 DIAGNOSIS — K21.9 GASTROESOPHAGEAL REFLUX DISEASE WITHOUT ESOPHAGITIS: ICD-10-CM

## 2024-04-09 DIAGNOSIS — J45.30 MILD PERSISTENT ASTHMA WITHOUT COMPLICATION: ICD-10-CM

## 2024-04-09 DIAGNOSIS — I10 ESSENTIAL HYPERTENSION: ICD-10-CM

## 2024-04-09 DIAGNOSIS — E66.01 CLASS 2 SEVERE OBESITY DUE TO EXCESS CALORIES WITH SERIOUS COMORBIDITY AND BODY MASS INDEX (BMI) OF 37.0 TO 37.9 IN ADULT (HCC): ICD-10-CM

## 2024-04-09 DIAGNOSIS — F41.9 ANXIETY: ICD-10-CM

## 2024-04-09 DIAGNOSIS — G47.30 SLEEP APNEA IN ADULT: ICD-10-CM

## 2024-04-09 DIAGNOSIS — Z12.5 SCREENING FOR MALIGNANT NEOPLASM OF PROSTATE: ICD-10-CM

## 2024-04-09 DIAGNOSIS — Z00.00 ENCOUNTER FOR WELLNESS EXAMINATION IN ADULT: Primary | ICD-10-CM

## 2024-04-09 DIAGNOSIS — E83.110 HEREDITARY HEMOCHROMATOSIS (HCC): ICD-10-CM

## 2024-04-09 DIAGNOSIS — E78.1 HYPERTRIGLYCERIDEMIA: ICD-10-CM

## 2024-04-09 PROCEDURE — 3079F DIAST BP 80-89 MM HG: CPT | Performed by: NURSE PRACTITIONER

## 2024-04-09 PROCEDURE — 3074F SYST BP LT 130 MM HG: CPT | Performed by: NURSE PRACTITIONER

## 2024-04-09 PROCEDURE — 99396 PREV VISIT EST AGE 40-64: CPT | Performed by: NURSE PRACTITIONER

## 2024-04-09 RX ORDER — OMEPRAZOLE 40 MG/1
40 CAPSULE, DELAYED RELEASE ORAL DAILY
COMMUNITY
Start: 2024-03-30 | End: 2024-04-09 | Stop reason: SDUPTHER

## 2024-04-09 RX ORDER — FLUOXETINE HYDROCHLORIDE 20 MG/1
20 CAPSULE ORAL DAILY
Qty: 30 CAPSULE | Refills: 5 | Status: SHIPPED | OUTPATIENT
Start: 2024-04-09

## 2024-04-09 RX ORDER — AMLODIPINE BESYLATE 10 MG/1
10 TABLET ORAL DAILY
Qty: 90 TABLET | Refills: 1 | Status: SHIPPED | OUTPATIENT
Start: 2024-04-09 | End: 2024-04-09 | Stop reason: CLARIF

## 2024-04-09 RX ORDER — OMEPRAZOLE 40 MG/1
40 CAPSULE, DELAYED RELEASE ORAL DAILY
Qty: 90 CAPSULE | Refills: 3 | Status: SHIPPED | OUTPATIENT
Start: 2024-04-09

## 2024-04-09 RX ORDER — FOSINOPRIL SODIUM 40 MG/1
40 TABLET ORAL DAILY
Qty: 90 TABLET | Refills: 3 | Status: SHIPPED | OUTPATIENT
Start: 2024-04-09

## 2024-04-09 RX ORDER — OMEPRAZOLE 40 MG/1
40 CAPSULE, DELAYED RELEASE ORAL DAILY
Qty: 90 CAPSULE | Refills: 1 | Status: SHIPPED | OUTPATIENT
Start: 2024-04-09 | End: 2024-04-09

## 2024-04-09 RX ORDER — AMLODIPINE BESYLATE 10 MG/1
10 TABLET ORAL DAILY
Qty: 90 TABLET | Refills: 3 | Status: SHIPPED | OUTPATIENT
Start: 2024-04-09

## 2024-04-09 RX ORDER — FOSINOPRIL SODIUM 40 MG/1
40 TABLET ORAL DAILY
Qty: 90 TABLET | Refills: 1 | Status: SHIPPED | OUTPATIENT
Start: 2024-04-09 | End: 2024-04-09

## 2024-04-09 NOTE — PROGRESS NOTES
[address], [address]  [place] [id number]  Dept: [phone]  Dept Fax: [phone]    Date of Service:  4/9/2024  Delfino LADONNA Olson   1970       Chief Complaint   Patient presents with    ED Follow-up     States was recently at [facility] ER for gastritis, was started on omeprazole         Diagnoses / Plan:   1. Encounter for wellness examination in adult  -     amLODIPine (NORVASC) 10 MG tablet; Take 1 tablet by mouth daily, Disp-90 tablet, R-3Normal  -     fosinopril (MONOPRIL) 40 MG tablet; Take 1 tablet by mouth daily, Disp-90 tablet, R-3Normal  -     CBC with Auto Differential  -     Comprehensive Metabolic Panel  -     Lipid Panel  -     Microalbumin Panel  -     Iron Profile  -     Ferritin  2. Essential hypertension  Assessment & Plan:   Well-controlled, continue current medications  Orders:  -     amLODIPine (NORVASC) 10 MG tablet; Take 1 tablet by mouth daily, Disp-90 tablet, R-3Normal  -     fosinopril (MONOPRIL) 40 MG tablet; Take 1 tablet by mouth daily, Disp-90 tablet, R-3Normal  -     Comprehensive Metabolic Panel  -     Lipid Panel  -     Microalbumin Panel  3. Hypertriglyceridemia  -     Lipid Panel  4. Anxiety  Assessment & Plan:  Start fluoxetine 20 mg daily. Explained drugs of the SSRI class can have side effects such as weight gain, sexual dysfunction, insomnia, headache, nausea. These medications are generally effective at alleviating symptoms of anxiety and/or depression. Notify the office if significant side effects do occur.  Orders:  -     FLUoxetine (PROZAC) 20 MG capsule; Take 1 capsule by mouth daily, Disp-30 capsule, R-5Normal  -     CBC with Auto Differential  -     Comprehensive Metabolic Panel  5. Gastroesophageal reflux disease without esophagitis  Assessment & Plan:   Well-controlled, continue current medications, medication adherence emphasized, and lifestyle modifications recommended  Orders:  -     omeprazole (PRILOSEC)

## 2024-04-10 LAB
ALBUMIN/GLOBULIN RATIO: 1.5 G/DL
ALBUMIN: 3.8 G/DL (ref 3.5–5)
ALP BLD-CCNC: 109 UNITS/L (ref 38–126)
ALT SERPL-CCNC: 29 UNITS/L (ref 4–50)
ANION GAP SERPL CALCULATED.3IONS-SCNC: 5.3 MMOL/L (ref 3–11)
AST SERPL-CCNC: 26 UNITS/L (ref 17–59)
BASOPHILS %: 1.63 (ref 0–3)
BASOPHILS ABSOLUTE: 0.11 (ref 0–0.3)
BILIRUB SERPL-MCNC: 0.4 MG/DL (ref 0.2–1.3)
BUN BLDV-MCNC: 19 MG/DL (ref 9–20)
CALCIUM SERPL-MCNC: 9 MG/DL (ref 8.4–10.2)
CHLORIDE BLD-SCNC: 108 MMOL/L (ref 98–120)
CHOLESTEROL/HDL RATIO: 4 RATIO (ref 0–4.5)
CHOLESTEROL: 185 MG/DL (ref 50–200)
CO2: 27 MMOL/L (ref 22–31)
CREAT SERPL-MCNC: 0.9 MG/DL (ref 0.7–1.3)
CREATININE, RANDOM URINE: 83 MG/DL (ref 20–370)
DIAGNOSTIC PSA: 0.56 NG/ML (ref 0–4)
EOSINOPHILS %: 3.03 (ref 0–10)
EOSINOPHILS ABSOLUTE: 0.2 (ref 0–1.1)
FERRITIN: 55 NG/DL (ref 16–323)
GFR CALCULATED: > 60
GLOBULIN: 2.6 G/DL
GLUCOSE: 107 MG/DL (ref 75–110)
HCT VFR BLD CALC: 44.7 % (ref 42–52)
HDLC SERPL-MCNC: 51 MG/DL (ref 36–68)
HEMOGLOBIN: 13.5 (ref 13.8–17.8)
IRON % SATURATION: 39 % (ref 15–38)
IRON: 116 MG/DL (ref 49–181)
LDL CHOLESTEROL CALCULATED: 117.2 MG/DL (ref 0–160)
LYMPHOCYTE %: 31.29 (ref 20–51.1)
LYMPHOCYTES ABSOLUTE: 2.09 (ref 1–5.5)
MCH RBC QN AUTO: 26.7 PG (ref 28.5–32.5)
MCHC RBC AUTO-ENTMCNC: 30.3 G/DL (ref 32–37)
MCV RBC AUTO: 88.1 FL (ref 80–94)
MICROALBUMIN UR-MCNC: < 0.6 MG/DL (ref 0–1.7)
MONOCYTES %: 11.33 (ref 1.7–9.3)
MONOCYTES ABSOLUTE: 0.76 (ref 0.1–1)
NEUTROPHILS ABSOLUTE: 3.52 (ref 2–8.1)
NEUTROPHILS RELATIVE PERCENT: 52.72 (ref 42.2–75.2)
PDW BLD-RTO: 13.4 % (ref 10–15.5)
PLATELET # BLD: 235.3 THOU/MM3 (ref 130–400)
POTASSIUM SERPL-SCNC: 4.2 MMOL/L (ref 3.6–5)
RBC: 5.07 M/UL (ref 4.7–6.1)
SODIUM BLD-SCNC: 140 MMOL/L (ref 135–145)
TOTAL IRON BINDING CAPACITY: 294 MG/DL (ref 261–497)
TOTAL PROTEIN, SERUM: 6.5 G/DL (ref 6.3–8.2)
TRIGL SERPL-MCNC: 84 MG/DL (ref 10–250)
VLDLC SERPL CALC-MCNC: 17 MG/DL (ref 0–50)
WBC: 6.7 THOU/ML3 (ref 4.8–10.8)

## 2024-04-20 PROBLEM — F41.9 ANXIETY: Status: ACTIVE | Noted: 2024-04-20

## 2024-04-20 ASSESSMENT — ENCOUNTER SYMPTOMS
SHORTNESS OF BREATH: 0
WHEEZING: 0
NAUSEA: 0
CONSTIPATION: 0
COUGH: 0
ABDOMINAL PAIN: 0
EYES NEGATIVE: 1
DIARRHEA: 0

## 2024-05-07 DIAGNOSIS — F41.9 ANXIETY: Primary | ICD-10-CM

## 2024-05-07 RX ORDER — ESCITALOPRAM OXALATE 10 MG/1
10 TABLET ORAL DAILY
Qty: 30 TABLET | Refills: 3 | Status: SHIPPED | OUTPATIENT
Start: 2024-05-07

## 2024-06-27 ENCOUNTER — TELEPHONE (OUTPATIENT)
Dept: FAMILY MEDICINE CLINIC | Age: 54
End: 2024-06-27

## 2024-09-09 ENCOUNTER — OFFICE VISIT (OUTPATIENT)
Dept: FAMILY MEDICINE CLINIC | Age: 54
End: 2024-09-09
Payer: COMMERCIAL

## 2024-09-09 VITALS
BODY MASS INDEX: 37.44 KG/M2 | HEART RATE: 75 BPM | HEIGHT: 69 IN | DIASTOLIC BLOOD PRESSURE: 82 MMHG | WEIGHT: 252.8 LBS | SYSTOLIC BLOOD PRESSURE: 126 MMHG | OXYGEN SATURATION: 96 %

## 2024-09-09 DIAGNOSIS — F41.9 ANXIETY: ICD-10-CM

## 2024-09-09 DIAGNOSIS — E78.5 HYPERLIPIDEMIA, UNSPECIFIED HYPERLIPIDEMIA TYPE: ICD-10-CM

## 2024-09-09 DIAGNOSIS — E66.01 CLASS 2 SEVERE OBESITY DUE TO EXCESS CALORIES WITH SERIOUS COMORBIDITY AND BODY MASS INDEX (BMI) OF 37.0 TO 37.9 IN ADULT (HCC): ICD-10-CM

## 2024-09-09 DIAGNOSIS — G47.30 SLEEP APNEA IN ADULT: ICD-10-CM

## 2024-09-09 DIAGNOSIS — E83.110 HEREDITARY HEMOCHROMATOSIS (HCC): ICD-10-CM

## 2024-09-09 DIAGNOSIS — Z13.6 SCREENING FOR AAA (ABDOMINAL AORTIC ANEURYSM): ICD-10-CM

## 2024-09-09 DIAGNOSIS — Z82.49 FAMILY HISTORY OF CORONARY ARTERY DISEASE OCCURRING PRIOR TO 55 YEARS OF AGE: ICD-10-CM

## 2024-09-09 DIAGNOSIS — M25.512 LEFT SHOULDER PAIN, UNSPECIFIED CHRONICITY: ICD-10-CM

## 2024-09-09 DIAGNOSIS — Z82.49 FAMILY HISTORY OF ABDOMINAL AORTIC ANEURYSM (AAA): ICD-10-CM

## 2024-09-09 DIAGNOSIS — I10 ESSENTIAL HYPERTENSION: Primary | ICD-10-CM

## 2024-09-09 DIAGNOSIS — J45.30 MILD PERSISTENT ASTHMA WITHOUT COMPLICATION: ICD-10-CM

## 2024-09-09 DIAGNOSIS — K21.9 GASTROESOPHAGEAL REFLUX DISEASE WITHOUT ESOPHAGITIS: ICD-10-CM

## 2024-09-09 PROCEDURE — 3074F SYST BP LT 130 MM HG: CPT | Performed by: NURSE PRACTITIONER

## 2024-09-09 PROCEDURE — 3079F DIAST BP 80-89 MM HG: CPT | Performed by: NURSE PRACTITIONER

## 2024-09-09 PROCEDURE — 99215 OFFICE O/P EST HI 40 MIN: CPT | Performed by: NURSE PRACTITIONER

## 2024-09-09 RX ORDER — ASPIRIN 81 MG/1
81 TABLET ORAL DAILY
COMMUNITY
Start: 2024-09-09

## 2024-09-09 RX ORDER — ATORVASTATIN CALCIUM 20 MG/1
20 TABLET, FILM COATED ORAL NIGHTLY
Qty: 30 TABLET | Refills: 5 | Status: SHIPPED | OUTPATIENT
Start: 2024-09-09

## 2024-09-21 PROBLEM — E78.5 HYPERLIPIDEMIA: Status: ACTIVE | Noted: 2021-01-27

## 2024-09-21 PROBLEM — M25.512 LEFT SHOULDER PAIN: Status: ACTIVE | Noted: 2024-09-21

## 2024-09-21 ASSESSMENT — ENCOUNTER SYMPTOMS
EYES NEGATIVE: 1
ABDOMINAL PAIN: 0
CONSTIPATION: 0
NAUSEA: 0
DIARRHEA: 0
COUGH: 0

## 2024-09-23 ENCOUNTER — HOSPITAL ENCOUNTER (OUTPATIENT)
Age: 54
Discharge: HOME | End: 2024-09-25
Payer: COMMERCIAL

## 2024-09-23 ENCOUNTER — HOSPITAL ENCOUNTER (OUTPATIENT)
Dept: CT IMAGING | Age: 54
Discharge: HOME | End: 2024-09-25
Payer: COMMERCIAL

## 2024-09-23 ENCOUNTER — HOSPITAL ENCOUNTER (OUTPATIENT)
Dept: INTERVENTIONAL RADIOLOGY/VASCULAR | Age: 54
Discharge: HOME | End: 2024-09-25
Payer: COMMERCIAL

## 2024-09-23 VITALS
HEART RATE: 63 BPM | SYSTOLIC BLOOD PRESSURE: 142 MMHG | DIASTOLIC BLOOD PRESSURE: 76 MMHG | HEIGHT: 68 IN | WEIGHT: 252 LBS | BODY MASS INDEX: 38.19 KG/M2

## 2024-09-23 DIAGNOSIS — Z82.49 FAMILY HISTORY OF CORONARY ARTERY DISEASE OCCURRING PRIOR TO 55 YEARS OF AGE: ICD-10-CM

## 2024-09-23 DIAGNOSIS — Z82.49 FAMILY HISTORY OF ABDOMINAL AORTIC ANEURYSM (AAA): ICD-10-CM

## 2024-09-23 DIAGNOSIS — Z13.6 SCREENING FOR AAA (ABDOMINAL AORTIC ANEURYSM): ICD-10-CM

## 2024-09-23 DIAGNOSIS — I10 ESSENTIAL HYPERTENSION: ICD-10-CM

## 2024-09-23 DIAGNOSIS — E78.5 HYPERLIPIDEMIA, UNSPECIFIED HYPERLIPIDEMIA TYPE: ICD-10-CM

## 2024-09-23 LAB
ECHO AO ASC DIAM: 3.1 CM
ECHO AO ASCENDING AORTA INDEX: 1.38 CM/M2
ECHO AO ROOT DIAM: 3.6 CM
ECHO AO ROOT INDEX: 1.6 CM/M2
ECHO AV AREA PEAK VELOCITY: 2.8 CM2
ECHO AV AREA/BSA PEAK VELOCITY: 1.2 CM2/M2
ECHO AV PEAK GRADIENT: 6 MMHG
ECHO AV PEAK VELOCITY: 1.3 M/S
ECHO AV VELOCITY RATIO: 0.85
ECHO BSA: 2.34 M2
ECHO EST RA PRESSURE: 3 MMHG
ECHO LA AREA 4C: 28.1 CM2
ECHO LA DIAMETER INDEX: 1.82 CM/M2
ECHO LA DIAMETER: 4.1 CM
ECHO LA MAJOR AXIS: 6.3 CM
ECHO LA TO AORTIC ROOT RATIO: 1.14
ECHO LA VOL MOD A4C: 102 ML (ref 18–58)
ECHO LA VOLUME INDEX MOD A4C: 45 ML/M2 (ref 16–34)
ECHO LV E' LATERAL VELOCITY: 8.8 CM/S
ECHO LV E' SEPTAL VELOCITY: 8.8 CM/S
ECHO LV EDV A2C: 89 ML
ECHO LV EDV NDEX A2C: 40 ML/M2
ECHO LV EF PHYSICIAN: 55 %
ECHO LV EJECTION FRACTION A2C: 55 %
ECHO LV ESV A2C: 40 ML
ECHO LV ESV INDEX A2C: 18 ML/M2
ECHO LV FRACTIONAL SHORTENING: 42 % (ref 28–44)
ECHO LV INTERNAL DIMENSION DIASTOLE INDEX: 2.44 CM/M2
ECHO LV INTERNAL DIMENSION DIASTOLIC: 5.5 CM (ref 4.2–5.9)
ECHO LV INTERNAL DIMENSION SYSTOLIC INDEX: 1.42 CM/M2
ECHO LV INTERNAL DIMENSION SYSTOLIC: 3.2 CM
ECHO LV ISOVOLUMETRIC RELAXATION TIME (IVRT): 102 MS
ECHO LV IVSD: 0.9 CM (ref 0.6–1)
ECHO LV MASS 2D: 242 G (ref 88–224)
ECHO LV MASS INDEX 2D: 107.6 G/M2 (ref 49–115)
ECHO LV POSTERIOR WALL DIASTOLIC: 1.3 CM (ref 0.6–1)
ECHO LV RELATIVE WALL THICKNESS RATIO: 0.47
ECHO LVOT AREA: 3.1 CM2
ECHO LVOT DIAM: 2 CM
ECHO LVOT PEAK GRADIENT: 5 MMHG
ECHO LVOT PEAK VELOCITY: 1.1 M/S
ECHO MV A VELOCITY: 0.88 M/S
ECHO MV E DECELERATION TIME (DT): 190 MS
ECHO MV E VELOCITY: 0.87 M/S
ECHO MV E/A RATIO: 0.99
ECHO MV E/E' LATERAL: 9.89
ECHO MV E/E' RATIO (AVERAGED): 9.89
ECHO MV E/E' SEPTAL: 9.89
ECHO MV MAX VELOCITY: 1 M/S
ECHO MV PEAK GRADIENT: 4 MMHG
ECHO PV MAX VELOCITY: 1 M/S
ECHO PV PEAK GRADIENT: 4 MMHG
ECHO RIGHT VENTRICULAR SYSTOLIC PRESSURE (RVSP): 28 MMHG
ECHO TV PEAK GRADIENT: 3 MMHG
ECHO TV REGURGITANT MAX VELOCITY: 2.52 M/S
ECHO TV REGURGITANT PEAK GRADIENT: 25 MMHG

## 2024-09-23 PROCEDURE — 76706 US ABDL AORTA SCREEN AAA: CPT

## 2024-09-23 PROCEDURE — 93306 TTE W/DOPPLER COMPLETE: CPT

## 2024-09-23 PROCEDURE — 76706 US ABDL AORTA SCREEN AAA: CPT | Performed by: STUDENT IN AN ORGANIZED HEALTH CARE EDUCATION/TRAINING PROGRAM

## 2024-09-23 PROCEDURE — 75571 CT HRT W/O DYE W/CA TEST: CPT

## 2024-09-23 PROCEDURE — 93306 TTE W/DOPPLER COMPLETE: CPT | Performed by: INTERNAL MEDICINE

## 2024-09-24 LAB
VAS AORTA DIST AP: 1.84 CM
VAS AORTA DIST PSV: 1.8 CM/S
VAS AORTA DIST PSV: 108 CM/S
VAS AORTA DIST PSV: 108 CM/S
VAS AORTA DIST TR: 2.15 CM
VAS AORTA MID AP: 1.74 CM
VAS AORTA MID PSV: 123.3 CM/S
VAS AORTA MID PSV: 123.3 CM/S
VAS AORTA MID PSV: 9.2 CM/S
VAS AORTA MID TRANS: 1.79 CM
VAS AORTA PROX AP: 1.97 CM
VAS AORTA PROX PSV: 123.3 CM/S
VAS AORTA PROX PSV: 123.3 CM/S
VAS AORTA PROX PSV: 35.6 CM/S
VAS AORTA PROX TR: 2.34 CM
VAS LEFT COM ILIAC AP: 1.07 CM
VAS LEFT COM ILIAC PROX PSV: 123.3 CM/S
VAS LEFT COM ILIAC TRANS: 1.25 CM
VAS RIGHT COM ILIAC AP: 1.2 CM
VAS RIGHT COM ILIAC PROX PSV: 90.4 CM/S
VAS RIGHT COM ILIAC TRANS: 1.2 CM

## 2024-11-01 ENCOUNTER — OFFICE VISIT (OUTPATIENT)
Dept: CARDIOLOGY | Age: 54
End: 2024-11-01
Payer: COMMERCIAL

## 2024-11-01 ENCOUNTER — NURSE ONLY (OUTPATIENT)
Dept: LAB | Age: 54
End: 2024-11-01

## 2024-11-01 VITALS
HEIGHT: 69 IN | BODY MASS INDEX: 38.06 KG/M2 | HEART RATE: 69 BPM | WEIGHT: 257 LBS | SYSTOLIC BLOOD PRESSURE: 128 MMHG | DIASTOLIC BLOOD PRESSURE: 82 MMHG

## 2024-11-01 DIAGNOSIS — Z82.49 FAMILY HISTORY OF CORONARY ARTERY DISEASE OCCURRING PRIOR TO 55 YEARS OF AGE: ICD-10-CM

## 2024-11-01 DIAGNOSIS — G47.30 SLEEP APNEA IN ADULT: ICD-10-CM

## 2024-11-01 DIAGNOSIS — I10 ESSENTIAL HYPERTENSION: Primary | ICD-10-CM

## 2024-11-01 DIAGNOSIS — E78.2 MIXED HYPERLIPIDEMIA: ICD-10-CM

## 2024-11-01 DIAGNOSIS — Z23 NEED FOR VACCINATION: Primary | ICD-10-CM

## 2024-11-01 PROCEDURE — 93000 ELECTROCARDIOGRAM COMPLETE: CPT | Performed by: INTERNAL MEDICINE

## 2024-11-01 PROCEDURE — 3079F DIAST BP 80-89 MM HG: CPT | Performed by: INTERNAL MEDICINE

## 2024-11-01 PROCEDURE — 99214 OFFICE O/P EST MOD 30 MIN: CPT | Performed by: INTERNAL MEDICINE

## 2024-11-01 PROCEDURE — 3074F SYST BP LT 130 MM HG: CPT | Performed by: INTERNAL MEDICINE

## 2024-11-01 RX ORDER — VITAMIN B COMPLEX
1 CAPSULE ORAL DAILY
COMMUNITY

## 2024-11-01 NOTE — PROGRESS NOTES
Cardiology Consultation/Follow Up.  [facility]    Delfino Olson  1970  [id number]    Today: 24    CC: Patient is here for hypertension      HPI:   Delfino Olson    54-year-old male with past medical history of hypertension, hyperlipidemia, premature CAD in the family, asthma, anxiety, [place] hemochromatosis    His brother, aged 51, recently  following a massive heart attack and subsequent triple bypass surgery. Patient reports brother had severe heart disease and poor dietary habits.  Patient other family members his significant history of CAD and hemochromatosis.       He is doing well from a cardiac standpoint. Good functional capacity with no significant change in functional capacity. No chest pain, no dyspnea, no PND, no syncope or pre-syncope, no orthopnea. No symptoms of CHF or angina/chest pain. Here for follow up regarding the above chronic stable cardiovascular conditions.      Today EKG showed normal sinus rhythm without any ST-T wave changes.    Patient follow-up with hemochromatosis physician regularly and give blood 2-3 times per year and his ferritin level is under control, he is not diabetic.    Patient advised that keep following with the hemochromatosis physician and patient is completely asymptomatic so we will not do further investigation at this time, we will do cardiac CTA likely next year just give him mental relaxation and make sure there is no disease as patient has a significant history of premature CAD.    Past Medical:  Past Medical History:   Diagnosis Date    Asthma     GERD (gastroesophageal reflux disease)     Hearing loss     Hemochromatosis carrier     Hypertension     Obesity     Sleep apnea          Past Surgical:  Past Surgical History:   Procedure Laterality Date    COLONOSCOPY  2019    Dr. Smith at [facility]    VASECTOMY           Family History:  Family History   Problem Relation Age of Onset    Coronary Art Dis Mother     Other

## 2025-01-04 DIAGNOSIS — I10 ESSENTIAL HYPERTENSION: ICD-10-CM

## 2025-01-04 DIAGNOSIS — Z82.49 FAMILY HISTORY OF CORONARY ARTERY DISEASE OCCURRING PRIOR TO 55 YEARS OF AGE: ICD-10-CM

## 2025-01-04 DIAGNOSIS — E66.01 CLASS 2 SEVERE OBESITY DUE TO EXCESS CALORIES WITH SERIOUS COMORBIDITY AND BODY MASS INDEX (BMI) OF 37.0 TO 37.9 IN ADULT: ICD-10-CM

## 2025-01-04 DIAGNOSIS — E78.5 HYPERLIPIDEMIA, UNSPECIFIED HYPERLIPIDEMIA TYPE: ICD-10-CM

## 2025-01-04 DIAGNOSIS — E66.812 CLASS 2 SEVERE OBESITY DUE TO EXCESS CALORIES WITH SERIOUS COMORBIDITY AND BODY MASS INDEX (BMI) OF 37.0 TO 37.9 IN ADULT: ICD-10-CM

## 2025-01-06 NOTE — TELEPHONE ENCOUNTER
Delfino Olson is calling to request a refill on the following medication(s):  Requested Prescriptions     Pending Prescriptions Disp Refills    atorvastatin (LIPITOR) 20 MG tablet 90 tablet 0     Sig: Take 1 tablet by mouth nightly       Last Visit Date (If Applicable):  9/9/2024    Next Visit Date:    Visit date not found

## 2025-01-07 RX ORDER — ATORVASTATIN CALCIUM 20 MG/1
20 TABLET, FILM COATED ORAL NIGHTLY
Qty: 90 TABLET | Refills: 3 | Status: SHIPPED | OUTPATIENT
Start: 2025-01-07

## 2025-01-08 DIAGNOSIS — E66.812 CLASS 2 SEVERE OBESITY DUE TO EXCESS CALORIES WITH SERIOUS COMORBIDITY AND BODY MASS INDEX (BMI) OF 37.0 TO 37.9 IN ADULT: ICD-10-CM

## 2025-01-08 DIAGNOSIS — Z82.49 FAMILY HISTORY OF CORONARY ARTERY DISEASE OCCURRING PRIOR TO 55 YEARS OF AGE: ICD-10-CM

## 2025-01-08 DIAGNOSIS — E78.5 HYPERLIPIDEMIA, UNSPECIFIED HYPERLIPIDEMIA TYPE: ICD-10-CM

## 2025-01-08 DIAGNOSIS — E66.01 CLASS 2 SEVERE OBESITY DUE TO EXCESS CALORIES WITH SERIOUS COMORBIDITY AND BODY MASS INDEX (BMI) OF 37.0 TO 37.9 IN ADULT: ICD-10-CM

## 2025-01-08 DIAGNOSIS — I10 ESSENTIAL HYPERTENSION: ICD-10-CM

## 2025-01-08 RX ORDER — ATORVASTATIN CALCIUM 20 MG/1
20 TABLET, FILM COATED ORAL NIGHTLY
Qty: 90 TABLET | Refills: 3 | OUTPATIENT
Start: 2025-01-08

## 2025-01-31 SDOH — ECONOMIC STABILITY: FOOD INSECURITY: WITHIN THE PAST 12 MONTHS, THE FOOD YOU BOUGHT JUST DIDN'T LAST AND YOU DIDN'T HAVE MONEY TO GET MORE.: NEVER TRUE

## 2025-01-31 SDOH — ECONOMIC STABILITY: INCOME INSECURITY: IN THE LAST 12 MONTHS, WAS THERE A TIME WHEN YOU WERE NOT ABLE TO PAY THE MORTGAGE OR RENT ON TIME?: NO

## 2025-01-31 SDOH — ECONOMIC STABILITY: FOOD INSECURITY: WITHIN THE PAST 12 MONTHS, YOU WORRIED THAT YOUR FOOD WOULD RUN OUT BEFORE YOU GOT MONEY TO BUY MORE.: NEVER TRUE

## 2025-01-31 ASSESSMENT — PATIENT HEALTH QUESTIONNAIRE - PHQ9
1. LITTLE INTEREST OR PLEASURE IN DOING THINGS: NOT AT ALL
SUM OF ALL RESPONSES TO PHQ QUESTIONS 1-9: 0
SUM OF ALL RESPONSES TO PHQ QUESTIONS 1-9: 0
2. FEELING DOWN, DEPRESSED OR HOPELESS: NOT AT ALL
2. FEELING DOWN, DEPRESSED OR HOPELESS: NOT AT ALL
SUM OF ALL RESPONSES TO PHQ QUESTIONS 1-9: 0
SUM OF ALL RESPONSES TO PHQ9 QUESTIONS 1 & 2: 0
1. LITTLE INTEREST OR PLEASURE IN DOING THINGS: NOT AT ALL
SUM OF ALL RESPONSES TO PHQ QUESTIONS 1-9: 0
SUM OF ALL RESPONSES TO PHQ9 QUESTIONS 1 & 2: 0

## 2025-02-03 ENCOUNTER — OFFICE VISIT (OUTPATIENT)
Dept: FAMILY MEDICINE CLINIC | Age: 55
End: 2025-02-03
Payer: COMMERCIAL

## 2025-02-03 VITALS
BODY MASS INDEX: 38.28 KG/M2 | SYSTOLIC BLOOD PRESSURE: 116 MMHG | HEART RATE: 81 BPM | DIASTOLIC BLOOD PRESSURE: 80 MMHG | OXYGEN SATURATION: 98 % | WEIGHT: 259.2 LBS

## 2025-02-03 DIAGNOSIS — E83.110 HEREDITARY HEMOCHROMATOSIS (HCC): ICD-10-CM

## 2025-02-03 DIAGNOSIS — E66.01 CLASS 2 SEVERE OBESITY DUE TO EXCESS CALORIES WITH SERIOUS COMORBIDITY AND BODY MASS INDEX (BMI) OF 37.0 TO 37.9 IN ADULT: ICD-10-CM

## 2025-02-03 DIAGNOSIS — Z13.1 SCREENING FOR DIABETES MELLITUS: ICD-10-CM

## 2025-02-03 DIAGNOSIS — E78.2 MIXED HYPERLIPIDEMIA: ICD-10-CM

## 2025-02-03 DIAGNOSIS — Z00.00 ENCOUNTER FOR WELLNESS EXAMINATION IN ADULT: Primary | ICD-10-CM

## 2025-02-03 DIAGNOSIS — J45.30 MILD PERSISTENT ASTHMA WITHOUT COMPLICATION: ICD-10-CM

## 2025-02-03 DIAGNOSIS — K21.9 GASTROESOPHAGEAL REFLUX DISEASE WITHOUT ESOPHAGITIS: ICD-10-CM

## 2025-02-03 DIAGNOSIS — G47.30 SLEEP APNEA IN ADULT: ICD-10-CM

## 2025-02-03 DIAGNOSIS — I10 ESSENTIAL HYPERTENSION: ICD-10-CM

## 2025-02-03 DIAGNOSIS — Z82.49 FAMILY HISTORY OF CORONARY ARTERY DISEASE OCCURRING PRIOR TO 55 YEARS OF AGE: ICD-10-CM

## 2025-02-03 DIAGNOSIS — E66.812 CLASS 2 SEVERE OBESITY DUE TO EXCESS CALORIES WITH SERIOUS COMORBIDITY AND BODY MASS INDEX (BMI) OF 37.0 TO 37.9 IN ADULT: ICD-10-CM

## 2025-02-03 LAB
ALBUMIN/GLOBULIN RATIO: 1.9 G/DL
ALBUMIN: 4.2 G/DL (ref 3.5–5)
ALP BLD-CCNC: 99 UNITS/L (ref 38–126)
ALT SERPL-CCNC: 37 UNITS/L (ref 4–50)
ANION GAP SERPL CALCULATED.3IONS-SCNC: 6 MMOL/L (ref 3–11)
AST SERPL-CCNC: 30 UNITS/L (ref 17–59)
BASOPHILS ABSOLUTE: 0.1 X10E3/?L (ref 0–0.3)
BASOPHILS RELATIVE PERCENT: 1.22 % (ref 0–3)
BILIRUB SERPL-MCNC: 0.9 MG/DL (ref 0.2–1.3)
BUN BLDV-MCNC: 12 MG/DL (ref 9–20)
CALCIUM SERPL-MCNC: 9.4 MG/DL (ref 8.4–10.2)
CHLORIDE BLD-SCNC: 104 MMOL/L (ref 98–120)
CHOLESTEROL, TOTAL: 114 MG/DL (ref 50–200)
CHOLESTEROL/HDL RATIO: 3 RATIO (ref 0–4.5)
CO2: 26 MMOL/L (ref 22–31)
CREAT SERPL-MCNC: 0.9 MG/DL (ref 0.7–1.3)
CREATININE, RANDOM URINE: 147.3 MG/DL (ref 20–370)
EOSINOPHILS ABSOLUTE: 0.12 X10E3/?L (ref 0–1.1)
EOSINOPHILS RELATIVE PERCENT: 1.5 % (ref 0–10)
FERRITIN: 158 NG/DL (ref 16–323)
GFR, ESTIMATED: > 60
GLOBULIN: 2.2 G/DL
GLUCOSE: 80 MG/DL (ref 75–110)
HBA1C MFR BLD: 5.4 % (ref 4.4–6.4)
HCT VFR BLD CALC: 41.9 % (ref 42–52)
HDLC SERPL-MCNC: 41 MG/DL (ref 36–68)
HEMOGLOBIN: 13.2 G/DL (ref 13.8–17.8)
IRON % SATURATION: 63 % (ref 15–38)
IRON: 180 MG/DL (ref 49–181)
LDL CHOLESTEROL: 57.6 MG/DL (ref 0–160)
LYMPHOCYTES ABSOLUTE: 2.79 X10E3/?L (ref 1–5.5)
LYMPHOCYTES RELATIVE PERCENT: 35.72 % (ref 20–51.1)
MCH RBC QN AUTO: 26.6 PG (ref 28.5–32.5)
MCHC RBC AUTO-ENTMCNC: 31.5 G/DL (ref 32–37)
MCV RBC AUTO: 84.6 FL (ref 80–94)
MICROALBUMIN/CREAT 24H UR: < 0.6 MG/DL (ref 0–1.7)
MONOCYTES ABSOLUTE: 0.64 X10E3/?L (ref 0.1–1)
MONOCYTES RELATIVE PERCENT: 8.12 % (ref 1.7–9.3)
NEUTROPHILS ABSOLUTE: 4.18 X10E3/?L (ref 2–8.1)
NEUTROPHILS RELATIVE PERCENT: 53.44 % (ref 42.2–75.2)
PDW BLD-RTO: 13.4 % (ref 10–15.5)
PLATELET # BLD: 244.8 THOU/MM3 (ref 130–400)
POTASSIUM SERPL-SCNC: 4.1 MMOL/L (ref 3.6–5)
RBC # BLD: 4.96 M/UL (ref 4.7–6.1)
SODIUM BLD-SCNC: 137 MMOL/L (ref 135–145)
TOTAL IRON BINDING CAPACITY: 284 MG/DL (ref 261–497)
TOTAL PROTEIN: 6.4 G/DL (ref 6.3–8.2)
TRIGL SERPL-MCNC: 77 MG/DL (ref 10–250)
VLDLC SERPL CALC-MCNC: 15 MG/DL (ref 0–50)
WBC # BLD: 7.8 THOU/ML3 (ref 4.8–10.8)

## 2025-02-03 PROCEDURE — 99396 PREV VISIT EST AGE 40-64: CPT | Performed by: NURSE PRACTITIONER

## 2025-02-03 PROCEDURE — 3079F DIAST BP 80-89 MM HG: CPT | Performed by: NURSE PRACTITIONER

## 2025-02-03 PROCEDURE — 3074F SYST BP LT 130 MM HG: CPT | Performed by: NURSE PRACTITIONER

## 2025-02-03 RX ORDER — AMLODIPINE BESYLATE 10 MG/1
10 TABLET ORAL DAILY
Qty: 90 TABLET | Refills: 3 | Status: SHIPPED | OUTPATIENT
Start: 2025-02-03

## 2025-02-03 RX ORDER — ALBUTEROL SULFATE 90 UG/1
2 INHALANT RESPIRATORY (INHALATION) EVERY 4 HOURS PRN
Qty: 3 EACH | Refills: 1 | Status: SHIPPED | OUTPATIENT
Start: 2025-02-03 | End: 2025-02-04

## 2025-02-03 RX ORDER — OMEPRAZOLE 40 MG/1
40 CAPSULE, DELAYED RELEASE ORAL DAILY
Qty: 90 CAPSULE | Refills: 3 | Status: SHIPPED | OUTPATIENT
Start: 2025-02-03

## 2025-02-03 RX ORDER — FOSINOPRIL SODIUM 40 MG/1
40 TABLET ORAL DAILY
Qty: 90 TABLET | Refills: 3 | Status: SHIPPED | OUTPATIENT
Start: 2025-02-03

## 2025-02-03 RX ORDER — ATORVASTATIN CALCIUM 20 MG/1
20 TABLET, FILM COATED ORAL NIGHTLY
Qty: 90 TABLET | Refills: 3 | Status: SHIPPED | OUTPATIENT
Start: 2025-02-03

## 2025-02-03 RX ORDER — IPRATROPIUM BROMIDE AND ALBUTEROL SULFATE 2.5; .5 MG/3ML; MG/3ML
1 SOLUTION RESPIRATORY (INHALATION) EVERY 6 HOURS PRN
Qty: 360 ML | Refills: 2 | Status: SHIPPED | OUTPATIENT
Start: 2025-02-03

## 2025-02-03 ASSESSMENT — ENCOUNTER SYMPTOMS
COUGH: 0
EYES NEGATIVE: 1
WHEEZING: 0
NAUSEA: 0
CONSTIPATION: 0
DIARRHEA: 0
SHORTNESS OF BREATH: 0
ABDOMINAL PAIN: 0

## 2025-02-03 NOTE — ASSESSMENT & PLAN NOTE
Chronic, not at goal (unstable), lifestyle modifications recommended.  Recently started fasting which has been helpful with losing weight.  Plans to continue for further weight loss.

## 2025-02-03 NOTE — PROGRESS NOTES
[address], [address]  [place] [address]  Dept: [phone]  Dept Fax: [phone]    Date of Service:  2/3/2025    Delfino Olson is a 54 y.o. male who presents today for his medical conditions/complaints as noted below.      Chief Complaint   Patient presents with    Follow-up     HTN, Yearly lab      DIAGNOSIS / PLAN:   Encounter for wellness examination in adult  -     Comprehensive Metabolic Panel; Future  -     Lipid, Fasting; Future  -     Albumin/Creatinine Ratio, Urine; Future  -     CBC with Auto Differential; Future  -     Iron and TIBC; Future  -     Ferritin; Future  -     Hemoglobin A1C; Future  -     amLODIPine (NORVASC) 10 MG tablet; Take 1 tablet by mouth daily, Disp-90 tablet, R-3Normal  -     fosinopril (MONOPRIL) 40 MG tablet; Take 1 tablet by mouth daily, Disp-90 tablet, R-3Normal  Essential hypertension  Assessment & Plan:   Chronic, at goal (stable), continue current treatment plan  Orders:  -     Comprehensive Metabolic Panel; Future  -     Lipid, Fasting; Future  -     Albumin/Creatinine Ratio, Urine; Future  -     CBC with Auto Differential; Future  -     atorvastatin (LIPITOR) 20 MG tablet; Take 1 tablet by mouth nightly, Disp-90 tablet, R-3Normal  -     amLODIPine (NORVASC) 10 MG tablet; Take 1 tablet by mouth daily, Disp-90 tablet, R-3Normal  -     fosinopril (MONOPRIL) 40 MG tablet; Take 1 tablet by mouth daily, Disp-90 tablet, R-3Normal  Mixed hyperlipidemia  -     Comprehensive Metabolic Panel; Future  -     Lipid, Fasting; Future  Hereditary hemochromatosis (HCC)  Assessment & Plan:  CBC and iron stores ordered  Orders:  -     Comprehensive Metabolic Panel; Future  -     Lipid, Fasting; Future  -     Albumin/Creatinine Ratio, Urine; Future  -     CBC with Auto Differential; Future  -     Iron and TIBC; Future  -     Ferritin; Future  Sleep apnea in adult  Assessment & Plan:  Uses CPAP faithfully every night, feels this is

## 2025-02-04 DIAGNOSIS — J45.30 MILD PERSISTENT ASTHMA WITHOUT COMPLICATION: ICD-10-CM

## 2025-02-04 RX ORDER — ALBUTEROL SULFATE 90 MCG
HFA AEROSOL WITH ADAPTER (GRAM) INHALATION
Qty: 3 EACH | Refills: 1 | Status: SHIPPED | OUTPATIENT
Start: 2025-02-04

## 2025-02-04 NOTE — TELEPHONE ENCOUNTER
Delfino Olson is requesting a refill on the following medication(s):  Requested Prescriptions     Pending Prescriptions Disp Refills    PROVENTIL  (90 Base) MCG/ACT inhaler [Pharmacy Med Name: PROVENTIL AER HFA]  1     Sig: USE 2 INHALATIONS ORALLY   EVERY 4 HOURS AS NEEDED FORWHEEZING OR SHORTNESS OF   BREATH.       Last Visit Date (If Applicable):  2/3/2025    Next Visit Date:    Visit date not found

## 2025-04-01 SDOH — HEALTH STABILITY: PHYSICAL HEALTH: ON AVERAGE, HOW MANY MINUTES DO YOU ENGAGE IN EXERCISE AT THIS LEVEL?: 20 MIN

## 2025-04-01 SDOH — HEALTH STABILITY: PHYSICAL HEALTH: ON AVERAGE, HOW MANY DAYS PER WEEK DO YOU ENGAGE IN MODERATE TO STRENUOUS EXERCISE (LIKE A BRISK WALK)?: 7 DAYS

## 2025-04-04 ENCOUNTER — OFFICE VISIT (OUTPATIENT)
Dept: FAMILY MEDICINE CLINIC | Age: 55
End: 2025-04-04

## 2025-04-04 VITALS
BODY MASS INDEX: 37.51 KG/M2 | WEIGHT: 254 LBS | OXYGEN SATURATION: 98 % | HEART RATE: 67 BPM | SYSTOLIC BLOOD PRESSURE: 134 MMHG | DIASTOLIC BLOOD PRESSURE: 76 MMHG

## 2025-04-04 DIAGNOSIS — I10 ESSENTIAL HYPERTENSION: ICD-10-CM

## 2025-04-04 DIAGNOSIS — G47.30 SLEEP APNEA IN ADULT: ICD-10-CM

## 2025-04-04 DIAGNOSIS — E83.110 HEREDITARY HEMOCHROMATOSIS: Primary | ICD-10-CM

## 2025-04-04 DIAGNOSIS — J45.30 MILD PERSISTENT ASTHMA WITHOUT COMPLICATION: ICD-10-CM

## 2025-04-04 NOTE — PROGRESS NOTES
[address], [address]  [place] [id number]  Phone: [phone]  Fax: [phone]      Date of Visit:  25  Patient Name: Delfino Olson   Patient :  1970     ASSESSMENT/PLAN     1. Hereditary hemochromatosis  2. Essential hypertension  3. Mild persistent asthma without complication  4. Sleep apnea in adult     Establish care visit with writer.  Medical history reviewed extensively.  No acute concerns need addressed today.    Patient donates blood through [facility] to keep iron and ferritin and hemoglobin levels down.  Heterozygous mutation.    Blood pressure is well-controlled.    Does have extensive family history of coronary disease. Has been seeing cardiology. Coronary calcium CT low risk. Will have cardiac CTA this year on follow up. Asymptomatic.    Follow up in 6 months    - Questions/concerns answered. Patient verbalized and expressed understanding. Medications, laboratory testing, imaging, consultation, and follow up as documented in this encounter.       HPI:     Delfino Olson is a 55 y.o. male with   Patient Active Problem List   Diagnosis    Essential hypertension    Gastroesophageal reflux disease without esophagitis    Mild persistent asthma without complication    Hereditary hemochromatosis    Class 2 severe obesity due to excess calories with serious comorbidity and body mass index (BMI) of 38.0 to 38.9 in adult    Hyperlipidemia    Sleep apnea in adult    Anxiety    Left shoulder pain     who presents today to discuss   Chief Complaint   Patient presents with    Established New Doctor     Here to get est reports no issues or complaints       HPI: Patient presents for new patient director.  Has no acute concerns today.      Patient denies any fevers, chills, headaches, visual changes, lightheadedness, dizziness, chest pain, palpitations, shortness of breath, abdominal pain, nausea, vomiting, dysuria, hematuria, issues with